# Patient Record
Sex: FEMALE | Race: WHITE | NOT HISPANIC OR LATINO | Employment: OTHER | URBAN - METROPOLITAN AREA
[De-identification: names, ages, dates, MRNs, and addresses within clinical notes are randomized per-mention and may not be internally consistent; named-entity substitution may affect disease eponyms.]

---

## 2017-01-06 ENCOUNTER — TELEPHONE (OUTPATIENT)
Dept: CARDIOLOGY | Facility: CLINIC | Age: 82
End: 2017-01-06

## 2017-01-06 NOTE — TELEPHONE ENCOUNTER
----- Message from KRAMA Edge sent at 12/19/2016  2:59 PM EST -----    Also, reassess BP on increased dose of BB    ----- Message -----     From: KARMA Edge     Sent: 12/17/2016  10:45 AM       To: KARMA Edge      Follow up on echo 12/30  Look at my note and talk with patient about atarax and thyroid contributing to jittery feeling

## 2017-01-06 NOTE — TELEPHONE ENCOUNTER
I reviewed echocardiogram results:     · Left ventricular function is normal.  · Left ventricular function is normal. Calculated EF = 65.2%.  · There are myxomatous changes of the mitral valve apparatus present.  · There is mild prolapse of both mitral valve leaflets.  · Trace mitral valve regurgitation is present.  · Mild to moderate tricuspid valve regurgitation is present.  · The calculated right ventricular systolic pressure is 30 mmHg (normal  · There is no evidence of pericardial effusion.    I just spoke to patient about echocardiogram results. We also discussed the metoprolol tartrate.  He said that she increased the medication to 25 mg twice a day and did not see much of a difference.  She is currently taking metoprolol tartrate 25 mg half tablet twice daily.    The patient did wear a Zio patch monitor and turned it back in.  The results are pending.  I will plan to follow up with the patient with results of her testing.  I've also recommended that she follow-up with her primary care physician about this jittery feeling as it may be secondary to her Atarax or possibly her thyroid.

## 2017-01-12 ENCOUNTER — TELEPHONE (OUTPATIENT)
Dept: CARDIOLOGY | Facility: CLINIC | Age: 82
End: 2017-01-12

## 2017-01-12 NOTE — TELEPHONE ENCOUNTER
The patient wore a Zio patch monitor her for 2 days and 23 hours for palpitations.  This showed normal sinus rhythm with average heart rate of 68 beats per minutes, range  bpm.  Rare premature atrial contractions and PVCs.  She was noted to have periods of ventricular bigeminy.  No sustained bradycardia or tachyarrhythmias.  Overall impression by Dr. Davon Segura was relatively benign monitor study.    I spoke with patient about results. She is still taking the metoprolol tartrate 25 mg twice daily.     She still feels jittery. I have recommended follow up that she follow up her PCP about the Atarax and thyroid possibly contributing to this jittery and anxious feeling.

## 2017-01-12 NOTE — TELEPHONE ENCOUNTER
----- Message from KARMA Edge sent at 1/11/2017  2:40 PM EST -----    Results pending    ----- Message -----     From: KARMA Edge     Sent: 1/10/2017  11:54 AM       To: KARMA Edge      Results pending    ----- Message -----     From: KARMA Edge     Sent: 1/6/2017   8:40 AM       To: KARMA Edge      Follow up on Zio patch monitor results

## 2017-05-17 DIAGNOSIS — R45.0 JITTERY FEELING: ICD-10-CM

## 2017-05-17 DIAGNOSIS — I48.0 PAROXYSMAL ATRIAL FIBRILLATION (HCC): ICD-10-CM

## 2017-12-18 ENCOUNTER — OFFICE VISIT (OUTPATIENT)
Dept: CARDIOLOGY | Facility: CLINIC | Age: 82
End: 2017-12-18

## 2017-12-18 VITALS
BODY MASS INDEX: 19.09 KG/M2 | HEART RATE: 68 BPM | HEIGHT: 65 IN | SYSTOLIC BLOOD PRESSURE: 158 MMHG | WEIGHT: 114.6 LBS | DIASTOLIC BLOOD PRESSURE: 80 MMHG

## 2017-12-18 DIAGNOSIS — R00.2 PALPITATIONS: Primary | ICD-10-CM

## 2017-12-18 PROCEDURE — 99213 OFFICE O/P EST LOW 20 MIN: CPT | Performed by: INTERNAL MEDICINE

## 2017-12-18 PROCEDURE — 93000 ELECTROCARDIOGRAM COMPLETE: CPT | Performed by: INTERNAL MEDICINE

## 2017-12-18 RX ORDER — ASPIRIN 81 MG/1
81 TABLET ORAL DAILY
COMMUNITY

## 2017-12-18 NOTE — PROGRESS NOTES
Date of Office Visit: 2017  Encounter Provider: Anthony Segura MD  Place of Service: [facility] CARDIOLOGY  Patient Name: Kiya Cramer  :1935  [id number]    Chief Complaint   Patient presents with   • Cardiac Valve Problem   • Atrial Fibrillation   :     HPI: Kiya Cramer is a 82 y.o. female  in the past she had 35 seconds of A. fib seen on the Holter at that time that aren't psych medicines were all out of whack and she was not in a good place.  We elected to manage her medically and conservatively with just a beta blocker and guess what she has done great.  No PND orthopnea and edema syncope palpitations bleeding difficulty she's exercising regularly    Past Medical History:   Diagnosis Date   • Acquired hypothyroidism 2015   • Health care maintenance    • Heart murmur    • Hypertension    • Mitral regurgitation     Trace to mild per 2-D echocardiogram 3/25/2013   • Mitral valve prolapse     Mild per 2-D echocardiogram 2016-trace mitral valve regurgitation   • Palpitations 2016    Description: had a prior holter with 35 sec of AF; stopped dilt and pradaxa in 3/13; added metoprolol; more palp and will get event recorder; event recorder was normal   • Paroxysmal atrial fibrillation     History of 35 second run of paroxysmal atrial fibrillation per Holter; The patient previously was on diltiazem and this was discontinued due to adverse effects; she was on Pradaxa but has an unsteady gait and this was discontinued; she has remained on metoprolol tartrate   • Right bundle branch block     Chronic   • Syndrome of inappropriate secretion of antidiuretic hormone 2016    Description: NA was 130 in 3/13   • Tricuspid regurgitation     Mild to moderate per 2-D echocardiogram 2016   • Unsteady gait    • Vitamin D deficiency        Past Surgical History:   Procedure Laterality Date   • HYSTERECTOMY         Social History     Social History   •  Marital status:      Spouse name: N/A   • Number of children: N/A   • Years of education: N/A     Occupational History   • Not on file.     Social History Main Topics   • Smoking status: Never Smoker   • Smokeless tobacco: Not on file      Comment: caffeine use    • Alcohol use No   • Drug use: Not on file   • Sexual activity: Defer     Other Topics Concern   • Not on file     Social History Narrative       Family History   Problem Relation Age of Onset   • Heart disease Mother    • Heart disease Father    • Heart failure Father        Review of Systems   Constitution: Negative for decreased appetite, fever, malaise/fatigue and weight loss.   HENT: Negative for nosebleeds.    Eyes: Negative for double vision.   Cardiovascular: Negative for chest pain, claudication, cyanosis, dyspnea on exertion, irregular heartbeat, leg swelling, near-syncope, orthopnea, palpitations, paroxysmal nocturnal dyspnea and syncope.   Respiratory: Negative for cough, hemoptysis and shortness of breath.    Hematologic/Lymphatic: Negative for bleeding problem.   Skin: Negative for rash.   Musculoskeletal: Negative for falls and myalgias.   Gastrointestinal: Negative for hematochezia, jaundice, melena, nausea and vomiting.   Genitourinary: Negative for hematuria.   Neurological: Negative for dizziness and seizures.   Psychiatric/Behavioral: Negative for altered mental status and memory loss.       Allergies   Allergen Reactions   • Cardizem [Diltiazem Hcl]          Current Outpatient Prescriptions:   •  alendronate (FOSAMAX) 70 MG tablet, Take  by mouth., Disp: , Rfl:   •  aspirin 81 MG EC tablet, Take 81 mg by mouth Daily., Disp: , Rfl:   •  clonazePAM (KlonoPIN) 0.5 MG tablet, Take  by mouth., Disp: , Rfl:   •  doxepin (SINEquan) 10 MG capsule, Take  by mouth., Disp: , Rfl:   •  levothyroxine (SYNTHROID, LEVOTHROID) 25 MCG tablet, Take 50 mcg by mouth., Disp: , Rfl:   •  metoprolol tartrate (LOPRESSOR) 25 MG tablet, TAKE ONE TABLET  "BY MOUTH TWICE A DAY, Disp: 60 tablet, Rfl: 5  •  mirtazapine (REMERON) 30 MG tablet, Take  by mouth., Disp: , Rfl:      Objective:     Vitals:    12/18/17 1302   BP: 158/80   Pulse: 68   Weight: 52 kg (114 lb 9.6 oz)   Height: 165.1 cm (65\")     Body mass index is 19.07 kg/(m^2).    Physical Exam   Constitutional: She is oriented to person, place, and time. She appears well-developed and well-nourished.   HENT:   Head: Normocephalic.   Eyes: No scleral icterus.   Neck: No JVD present. No thyromegaly present.   Cardiovascular: Normal rate, regular rhythm and normal heart sounds.  Exam reveals no gallop and no friction rub.    No murmur heard.  Pulmonary/Chest: Effort normal and breath sounds normal. She has no wheezes. She has no rales.   Abdominal: Soft. There is no hepatosplenomegaly. There is no tenderness.   Musculoskeletal: Normal range of motion. She exhibits no edema.   Lymphadenopathy:     She has no cervical adenopathy.   Neurological: She is alert and oriented to person, place, and time.   Skin: Skin is warm and dry. No rash noted.   Psychiatric: She has a normal mood and affect.         ECG 12 Lead  Date/Time: 12/18/2017 1:36 PM  Performed by: KENA STOUT  Authorized by: KENA STOUT   Comparison: compared with previous ECG   Similar to previous ECG  Rhythm: sinus rhythm  Conduction: complete RBBB  Clinical impression: abnormal ECG             Assessment:       Diagnosis Plan   1. Palpitations            Plan:       She's doing great probably because I don't think she has a whole lot wrong with her the whole issues can be hopefully she never recurs with A. fib because were not anticoagulating her but I can't really see doing effort is 35 seconds, and she's had no symptoms of recurrence we will continue her on her metoprolol have her come back and see Lucille in a year and see me in 2 years    As always, it has been a pleasure to participate in your patient's care.      Sincerely,       Kena " MD [place]

## 2018-05-16 DIAGNOSIS — I48.0 PAROXYSMAL ATRIAL FIBRILLATION (HCC): ICD-10-CM

## 2018-05-16 DIAGNOSIS — R45.0 JITTERY FEELING: ICD-10-CM

## 2018-12-19 ENCOUNTER — OFFICE VISIT (OUTPATIENT)
Dept: CARDIOLOGY | Facility: CLINIC | Age: 83
End: 2018-12-19

## 2018-12-19 VITALS
SYSTOLIC BLOOD PRESSURE: 130 MMHG | HEIGHT: 68 IN | DIASTOLIC BLOOD PRESSURE: 82 MMHG | HEART RATE: 75 BPM | OXYGEN SATURATION: 98 % | WEIGHT: 125 LBS | BODY MASS INDEX: 18.94 KG/M2

## 2018-12-19 DIAGNOSIS — I48.0 PAROXYSMAL ATRIAL FIBRILLATION (HCC): Primary | ICD-10-CM

## 2018-12-19 PROCEDURE — 93000 ELECTROCARDIOGRAM COMPLETE: CPT | Performed by: PHYSICIAN ASSISTANT

## 2018-12-19 PROCEDURE — 99213 OFFICE O/P EST LOW 20 MIN: CPT | Performed by: PHYSICIAN ASSISTANT

## 2018-12-19 RX ORDER — SUVOREXANT 10 MG/1
10 TABLET, FILM COATED ORAL DAILY
COMMUNITY
Start: 2018-11-23

## 2018-12-19 NOTE — PROGRESS NOTES
Date of Office Visit: 2018  Encounter Provider: HENRIETTA Calderón  Place of Service: [facility] CARDIOLOGY  Patient Name: Kiya Cramer  :1935    Chief Complaint   Patient presents with   • Atrial Fibrillation     1 year follow up   :     HPI: Kiya Cramer is a 83 y.o. female who presents today for follow-up.  Old records have been obtained and reviewed by me.  She is a patient of Dr. Segura's with a past cardiac history significant for atrial fibrillation.  She had 35 seconds of atrial fibrillation that was found on a Holter monitor.  At the time that she was wearing the Holter monitor, her psychiatric meds were to being adjusted and she was not any good place according to Dr. Segura.  She has been managed medically and conservatively with beta-blockade and done very well.  She was last in our office to see Dr. Segura on 2017.  At that visit she was in sinus rhythm and was doing well without complaints of angina or heart failure.  No changes were made to her medical regimen, and she's here today for yearly visit.   Over the past year she's been doing well.  She denies any chest pain, shortness of breath, palpitations, edema, dizziness, or syncope.  She exercises at the gym 3 days a week doing [employer] classes.  She can do this without difficulty.  She does not think she has had any episodes of palpitations, high heart rates, or atrial fibrillation.    Past Medical History:   Diagnosis Date   • Acquired hypothyroidism 2015   • Health care maintenance    • Heart murmur    • Hypertension    • Mitral regurgitation     Trace to mild per 2-D echocardiogram 3/25/2013   • Mitral valve prolapse     Mild per 2-D echocardiogram 2016-trace mitral valve regurgitation   • Palpitations 2016    Description: had a prior holter with 35 sec of AF; stopped dilt and pradaxa in 3/13; added metoprolol; more palp and will get event recorder; event recorder  was normal   • Paroxysmal atrial fibrillation (CMS/HCC)     History of 35 second run of paroxysmal atrial fibrillation per Holter; The patient previously was on diltiazem and this was discontinued due to adverse effects; she was on Pradaxa but has an unsteady gait and this was discontinued; she has remained on metoprolol tartrate   • Right bundle branch block     Chronic   • Syndrome of inappropriate secretion of antidiuretic hormone (CMS/HCC) 12/14/2016    Description: NA was 130 in 3/13   • Tricuspid regurgitation     Mild to moderate per 2-D echocardiogram 12/30/2016   • Unsteady gait    • Vitamin D deficiency        Past Surgical History:   Procedure Laterality Date   • HYSTERECTOMY         Social History     Socioeconomic History   • Marital status:      Spouse name: Not on file   • Number of children: Not on file   • Years of education: Not on file   • Highest education level: Not on file   Social Needs   • Financial resource strain: Not on file   • Food insecurity - worry: Not on file   • Food insecurity - inability: Not on file   • Transportation needs - medical: Not on file   • Transportation needs - non-medical: Not on file   Occupational History   • Not on file   Tobacco Use   • Smoking status: Never Smoker   • Smokeless tobacco: Never Used   • Tobacco comment: caffeine use    Substance and Sexual Activity   • Alcohol use: No   • Drug use: No   • Sexual activity: Defer   Other Topics Concern   • Not on file   Social History Narrative   • Not on file       Family History   Problem Relation Age of Onset   • Heart disease Mother    • Heart disease Father    • Heart failure Father    • No Known Problems Maternal Grandmother    • No Known Problems Maternal Grandfather    • No Known Problems Paternal Grandmother    • No Known Problems Paternal Grandfather        Review of Systems   Constitution: Negative for chills, fever and malaise/fatigue.   Cardiovascular: Negative for chest pain, dyspnea on  "exertion, leg swelling, near-syncope, orthopnea, palpitations, paroxysmal nocturnal dyspnea and syncope.   Respiratory: Negative for cough and shortness of breath.    Musculoskeletal: Negative for joint pain, joint swelling and myalgias.   Gastrointestinal: Negative for abdominal pain, diarrhea, melena, nausea and vomiting.   Genitourinary: Negative for frequency and hematuria.   Neurological: Negative for light-headedness, numbness, paresthesias and seizures.   Allergic/Immunologic: Negative.    All other systems reviewed and are negative.      Allergies   Allergen Reactions   • Cardizem [Diltiazem Hcl]          Current Outpatient Medications:   •  alendronate (FOSAMAX) 70 MG tablet, Take 70 mg by mouth Every 7 (Seven) Days., Disp: , Rfl:   •  aspirin 81 MG EC tablet, Take 81 mg by mouth Daily., Disp: , Rfl:   •  BELSOMRA 10 MG tablet, Take 10 mg by mouth Daily., Disp: , Rfl:   •  clonazePAM (KlonoPIN) 0.5 MG tablet, Take 0.5 mg by mouth Daily., Disp: , Rfl:   •  doxepin (SINEquan) 10 MG capsule, Take 20 mg by mouth Every Night., Disp: , Rfl:   •  levothyroxine (SYNTHROID, LEVOTHROID) 25 MCG tablet, Take 50 mcg by mouth Daily., Disp: , Rfl:   •  metoprolol tartrate (LOPRESSOR) 25 MG tablet, TAKE ONE TABLET BY MOUTH TWICE A DAY (Patient taking differently: TAKE 0.5 TABLET BY MOUTH TWICE A DAY), Disp: 180 tablet, Rfl: 3  •  mirtazapine (REMERON) 30 MG tablet, Take 15 mg by mouth Every Night., Disp: , Rfl:       Objective:     Vitals:    12/19/18 1426 12/19/18 1434   BP: 126/78 130/82   BP Location: Right arm Left arm   Pulse: 75    SpO2: 98%    Weight: 56.7 kg (125 lb)    Height: 172.7 cm (68\")      Body mass index is 19.01 kg/m².    PHYSICAL EXAM:    Physical Exam   Constitutional: She is oriented to person, place, and time. She appears well-developed and well-nourished. No distress.   HENT:   Head: Normocephalic and atraumatic.   Eyes: Pupils are equal, round, and reactive to light.   Neck: No JVD present. No " thyromegaly present.   Cardiovascular: Normal rate, regular rhythm, normal heart sounds and intact distal pulses.   No murmur heard.  Pulmonary/Chest: Effort normal and breath sounds normal. No respiratory distress.   Abdominal: Soft. Bowel sounds are normal. She exhibits no distension. There is no splenomegaly or hepatomegaly. There is no tenderness.   Musculoskeletal: Normal range of motion. She exhibits no edema.   Neurological: She is alert and oriented to person, place, and time.   Skin: Skin is warm and dry. She is not diaphoretic. No erythema.   Psychiatric: She has a normal mood and affect. Her behavior is normal. Judgment normal.         ECG 12 Lead  Date/Time: 12/19/2018 2:54 PM  Performed by: Lucille Hemphill PA  Authorized by: Lucille Hemphill PA   Comparison: compared with previous ECG from 12/18/2017  Similar to previous ECG  Rhythm: sinus rhythm  BPM: 67  Conduction: right bundle branch block  Clinical impression: abnormal ECG  Comments: Indication: Paroxysmal atrial fibrillation.              Assessment:       Diagnosis Plan   1. Paroxysmal atrial fibrillation (CMS/HCC)  ECG 12 Lead     Orders Placed This Encounter   Procedures   • ECG 12 Lead     This order was created via procedure documentation          Plan:       Atrial Fibrillation and Atrial Flutter  Assessment  • The patient has paroxysmal atrial fibrillation  • This is non-valvular in etiology  • The patient's CHADS2-VASc score is 3  • A [id number] score of 2 or more is considered a high risk for a thromboembolic event  • Aspirin prescribed    Plan  • Attempt to maintain sinus rhythm  • Add aspirin for antithrombotic therapy  • Continue beta blocker for rhythm control  • Continue beta blocker for rate control    Subjective - Objective  • The average duration of atrial fibrillation episodes is <48 hours  • She had a 35 seconds of atrial fibrillation when her psychiatric medications were being adjusted.  She has not had any since.  For  this reason, she has not been anticoagulated.  Overall she is doing well without complaints of angina or heart failure.  I'm not going to make any changes to her medical regimen, and she will follow-up with Dr. Segura in 1 year or sooner if needed.        As always, it has been a pleasure to participate in your patient's care.      Sincerely,         Lucille Hemphill PA-C

## 2019-07-10 DIAGNOSIS — R45.0 JITTERY FEELING: ICD-10-CM

## 2019-07-10 DIAGNOSIS — I48.0 PAROXYSMAL ATRIAL FIBRILLATION (HCC): ICD-10-CM

## 2019-07-10 NOTE — TELEPHONE ENCOUNTER
Leno patient needs a refill on her metoprolol  Please correct med. List and send in  Thanks, Maribell

## 2019-09-06 ENCOUNTER — LAB REQUISITION (OUTPATIENT)
Dept: LAB | Facility: HOSPITAL | Age: 84
End: 2019-09-06

## 2019-09-06 DIAGNOSIS — Z00.00 ROUTINE GENERAL MEDICAL EXAMINATION AT A HEALTH CARE FACILITY: ICD-10-CM

## 2019-09-06 LAB
ALBUMIN SERPL-MCNC: 4.1 G/DL (ref 3.5–5.2)
ALBUMIN/GLOB SERPL: 1.7 G/DL
ALP SERPL-CCNC: 234 U/L (ref 39–117)
ALT SERPL W P-5'-P-CCNC: 11 U/L (ref 1–33)
ANION GAP SERPL CALCULATED.3IONS-SCNC: 13.8 MMOL/L (ref 5–15)
AST SERPL-CCNC: 18 U/L (ref 1–32)
BILIRUB SERPL-MCNC: 0.3 MG/DL (ref 0.2–1.2)
BUN BLD-MCNC: 20 MG/DL (ref 8–23)
BUN/CREAT SERPL: 31.7 (ref 7–25)
CALCIUM SPEC-SCNC: 9.2 MG/DL (ref 8.6–10.5)
CHLORIDE SERPL-SCNC: 100 MMOL/L (ref 98–107)
CO2 SERPL-SCNC: 26.2 MMOL/L (ref 22–29)
CREAT BLD-MCNC: 0.63 MG/DL (ref 0.57–1)
GFR SERPL CREATININE-BSD FRML MDRD: 90 ML/MIN/1.73
GLOBULIN UR ELPH-MCNC: 2.4 GM/DL
GLUCOSE BLD-MCNC: 93 MG/DL (ref 65–99)
POTASSIUM BLD-SCNC: 4.3 MMOL/L (ref 3.5–5.2)
PROT SERPL-MCNC: 6.5 G/DL (ref 6–8.5)
SODIUM BLD-SCNC: 140 MMOL/L (ref 136–145)

## 2019-09-06 PROCEDURE — 80053 COMPREHEN METABOLIC PANEL: CPT

## 2019-10-15 ENCOUNTER — TELEPHONE (OUTPATIENT)
Dept: CARDIOLOGY | Facility: CLINIC | Age: 84
End: 2019-10-15

## 2019-10-15 RX ORDER — METOPROLOL TARTRATE 50 MG/1
50 TABLET, FILM COATED ORAL 2 TIMES DAILY
Qty: 180 TABLET | Refills: 2 | Status: SHIPPED | OUTPATIENT
Start: 2019-10-15 | End: 2019-11-01 | Stop reason: SDUPTHER

## 2019-10-15 NOTE — TELEPHONE ENCOUNTER
Daughter called stated needed to get a refill on metoprolol 50mg 1 po bid  States this was increased at [facility] cardiologist she had fallen and they increased this  Is this ok to refill?

## 2019-11-01 RX ORDER — METOPROLOL TARTRATE 50 MG/1
50 TABLET, FILM COATED ORAL 2 TIMES DAILY
Qty: 180 TABLET | Refills: 2 | Status: SHIPPED | OUTPATIENT
Start: 2019-11-01

## 2023-03-06 ENCOUNTER — APPOINTMENT (OUTPATIENT)
Dept: GENERAL RADIOLOGY | Facility: HOSPITAL | Age: 88
DRG: 177 | End: 2023-03-06
Payer: MEDICARE

## 2023-03-06 ENCOUNTER — APPOINTMENT (OUTPATIENT)
Dept: CT IMAGING | Facility: HOSPITAL | Age: 88
DRG: 177 | End: 2023-03-06
Payer: MEDICARE

## 2023-03-06 ENCOUNTER — HOSPITAL ENCOUNTER (INPATIENT)
Facility: HOSPITAL | Age: 88
LOS: 2 days | Discharge: NURSING, REHAB OR LONG-TERM CARE | DRG: 177 | End: 2023-03-10
Attending: EMERGENCY MEDICINE | Admitting: INTERNAL MEDICINE
Payer: MEDICARE

## 2023-03-06 DIAGNOSIS — R47.81 SLURRED SPEECH: ICD-10-CM

## 2023-03-06 DIAGNOSIS — R41.82 ALTERED MENTAL STATUS, UNSPECIFIED ALTERED MENTAL STATUS TYPE: Primary | ICD-10-CM

## 2023-03-06 DIAGNOSIS — N39.0 URINARY TRACT INFECTION WITHOUT HEMATURIA, SITE UNSPECIFIED: ICD-10-CM

## 2023-03-06 LAB — GLUCOSE BLDC GLUCOMTR-MCNC: 110 MG/DL (ref 70–130)

## 2023-03-06 PROCEDURE — 81001 URINALYSIS AUTO W/SCOPE: CPT | Performed by: EMERGENCY MEDICINE

## 2023-03-06 PROCEDURE — 80307 DRUG TEST PRSMV CHEM ANLYZR: CPT | Performed by: EMERGENCY MEDICINE

## 2023-03-06 PROCEDURE — 93005 ELECTROCARDIOGRAM TRACING: CPT | Performed by: EMERGENCY MEDICINE

## 2023-03-06 PROCEDURE — 84481 FREE ASSAY (FT-3): CPT | Performed by: EMERGENCY MEDICINE

## 2023-03-06 PROCEDURE — 85025 COMPLETE CBC W/AUTO DIFF WBC: CPT | Performed by: EMERGENCY MEDICINE

## 2023-03-06 PROCEDURE — 85730 THROMBOPLASTIN TIME PARTIAL: CPT | Performed by: EMERGENCY MEDICINE

## 2023-03-06 PROCEDURE — 87086 URINE CULTURE/COLONY COUNT: CPT | Performed by: EMERGENCY MEDICINE

## 2023-03-06 PROCEDURE — 84443 ASSAY THYROID STIM HORMONE: CPT | Performed by: EMERGENCY MEDICINE

## 2023-03-06 PROCEDURE — P9612 CATHETERIZE FOR URINE SPEC: HCPCS

## 2023-03-06 PROCEDURE — 82077 ASSAY SPEC XCP UR&BREATH IA: CPT | Performed by: EMERGENCY MEDICINE

## 2023-03-06 PROCEDURE — 99285 EMERGENCY DEPT VISIT HI MDM: CPT

## 2023-03-06 PROCEDURE — 70450 CT HEAD/BRAIN W/O DYE: CPT

## 2023-03-06 PROCEDURE — 83735 ASSAY OF MAGNESIUM: CPT | Performed by: EMERGENCY MEDICINE

## 2023-03-06 PROCEDURE — 84145 PROCALCITONIN (PCT): CPT | Performed by: EMERGENCY MEDICINE

## 2023-03-06 PROCEDURE — 84484 ASSAY OF TROPONIN QUANT: CPT | Performed by: EMERGENCY MEDICINE

## 2023-03-06 PROCEDURE — 83880 ASSAY OF NATRIURETIC PEPTIDE: CPT | Performed by: EMERGENCY MEDICINE

## 2023-03-06 PROCEDURE — 85610 PROTHROMBIN TIME: CPT | Performed by: EMERGENCY MEDICINE

## 2023-03-06 PROCEDURE — 83605 ASSAY OF LACTIC ACID: CPT | Performed by: EMERGENCY MEDICINE

## 2023-03-06 PROCEDURE — 82962 GLUCOSE BLOOD TEST: CPT

## 2023-03-06 PROCEDURE — 80053 COMPREHEN METABOLIC PANEL: CPT | Performed by: EMERGENCY MEDICINE

## 2023-03-06 PROCEDURE — 71045 X-RAY EXAM CHEST 1 VIEW: CPT

## 2023-03-06 PROCEDURE — 84439 ASSAY OF FREE THYROXINE: CPT | Performed by: EMERGENCY MEDICINE

## 2023-03-06 RX ORDER — SODIUM CHLORIDE 0.9 % (FLUSH) 0.9 %
10 SYRINGE (ML) INJECTION AS NEEDED
Status: DISCONTINUED | OUTPATIENT
Start: 2023-03-06 | End: 2023-03-10 | Stop reason: HOSPADM

## 2023-03-07 ENCOUNTER — APPOINTMENT (OUTPATIENT)
Dept: MRI IMAGING | Facility: HOSPITAL | Age: 88
DRG: 177 | End: 2023-03-07
Payer: MEDICARE

## 2023-03-07 PROBLEM — T83.511A UTI (URINARY TRACT INFECTION) DUE TO URINARY INDWELLING CATHETER (HCC): Status: ACTIVE | Noted: 2023-03-07

## 2023-03-07 PROBLEM — D50.9 IRON DEFICIENCY ANEMIA: Status: ACTIVE | Noted: 2023-03-07

## 2023-03-07 PROBLEM — R13.12 OROPHARYNGEAL DYSPHAGIA: Status: ACTIVE | Noted: 2023-03-07

## 2023-03-07 PROBLEM — N39.0 UTI (URINARY TRACT INFECTION) DUE TO URINARY INDWELLING CATHETER (HCC): Status: ACTIVE | Noted: 2023-03-07

## 2023-03-07 PROBLEM — R41.82 ALTERED MENTAL STATUS, UNSPECIFIED ALTERED MENTAL STATUS TYPE: Status: ACTIVE | Noted: 2023-03-07

## 2023-03-07 PROBLEM — J18.9 CAP (COMMUNITY ACQUIRED PNEUMONIA): Status: ACTIVE | Noted: 2023-03-07

## 2023-03-07 LAB
ALBUMIN SERPL-MCNC: 3.1 G/DL (ref 3.5–5.2)
ALBUMIN SERPL-MCNC: 3.6 G/DL (ref 3.5–5.2)
ALBUMIN/GLOB SERPL: 0.9 G/DL
ALBUMIN/GLOB SERPL: 1 G/DL
ALP SERPL-CCNC: 132 U/L (ref 39–117)
ALP SERPL-CCNC: 149 U/L (ref 39–117)
ALT SERPL W P-5'-P-CCNC: 14 U/L (ref 1–33)
ALT SERPL W P-5'-P-CCNC: 8 U/L (ref 1–33)
AMPHET+METHAMPHET UR QL: NEGATIVE
ANION GAP SERPL CALCULATED.3IONS-SCNC: 11 MMOL/L (ref 5–15)
ANION GAP SERPL CALCULATED.3IONS-SCNC: 12 MMOL/L (ref 5–15)
APTT PPP: 33.2 SECONDS (ref 22.7–35.4)
AST SERPL-CCNC: 14 U/L (ref 1–32)
AST SERPL-CCNC: 15 U/L (ref 1–32)
B PARAPERT DNA SPEC QL NAA+PROBE: NOT DETECTED
B PERT DNA SPEC QL NAA+PROBE: NOT DETECTED
BACTERIA UR QL AUTO: ABNORMAL /HPF
BARBITURATES UR QL SCN: NEGATIVE
BASOPHILS # BLD AUTO: 0.02 10*3/MM3 (ref 0–0.2)
BASOPHILS NFR BLD AUTO: 0.2 % (ref 0–1.5)
BENZODIAZ UR QL SCN: NEGATIVE
BILIRUB SERPL-MCNC: 0.2 MG/DL (ref 0–1.2)
BILIRUB SERPL-MCNC: 0.3 MG/DL (ref 0–1.2)
BILIRUB UR QL STRIP: NEGATIVE
BUN SERPL-MCNC: 20 MG/DL (ref 8–23)
BUN SERPL-MCNC: 26 MG/DL (ref 8–23)
BUN/CREAT SERPL: 40.6 (ref 7–25)
BUN/CREAT SERPL: 50 (ref 7–25)
C PNEUM DNA NPH QL NAA+NON-PROBE: NOT DETECTED
CALCIUM SPEC-SCNC: 9 MG/DL (ref 8.6–10.5)
CALCIUM SPEC-SCNC: 9.9 MG/DL (ref 8.6–10.5)
CANNABINOIDS SERPL QL: NEGATIVE
CHLORIDE SERPL-SCNC: 89 MMOL/L (ref 98–107)
CHLORIDE SERPL-SCNC: 91 MMOL/L (ref 98–107)
CHOLEST SERPL-MCNC: 160 MG/DL (ref 0–200)
CLARITY UR: CLEAR
CO2 SERPL-SCNC: 29 MMOL/L (ref 22–29)
CO2 SERPL-SCNC: 30 MMOL/L (ref 22–29)
COCAINE UR QL: NEGATIVE
COLOR UR: ABNORMAL
CREAT SERPL-MCNC: 0.4 MG/DL (ref 0.57–1)
CREAT SERPL-MCNC: 0.64 MG/DL (ref 0.57–1)
D-LACTATE SERPL-SCNC: 1.3 MMOL/L (ref 0.5–2)
DEPRECATED RDW RBC AUTO: 44.3 FL (ref 37–54)
DEPRECATED RDW RBC AUTO: 46.5 FL (ref 37–54)
EGFRCR SERPLBLD CKD-EPI 2021: 86.7 ML/MIN/1.73
EGFRCR SERPLBLD CKD-EPI 2021: 97.1 ML/MIN/1.73
EOSINOPHIL # BLD AUTO: 0.05 10*3/MM3 (ref 0–0.4)
EOSINOPHIL NFR BLD AUTO: 0.5 % (ref 0.3–6.2)
ERYTHROCYTE [DISTWIDTH] IN BLOOD BY AUTOMATED COUNT: 13.2 % (ref 12.3–15.4)
ERYTHROCYTE [DISTWIDTH] IN BLOOD BY AUTOMATED COUNT: 13.5 % (ref 12.3–15.4)
ETHANOL BLD-MCNC: <10 MG/DL (ref 0–10)
ETHANOL UR QL: <0.01 %
FLUAV SUBTYP SPEC NAA+PROBE: NOT DETECTED
FLUBV RNA ISLT QL NAA+PROBE: NOT DETECTED
GEN 5 2HR TROPONIN T REFLEX: 18 NG/L
GLOBULIN UR ELPH-MCNC: 3.4 GM/DL
GLOBULIN UR ELPH-MCNC: 3.6 GM/DL
GLUCOSE BLDC GLUCOMTR-MCNC: 70 MG/DL (ref 70–130)
GLUCOSE BLDC GLUCOMTR-MCNC: 73 MG/DL (ref 70–130)
GLUCOSE SERPL-MCNC: 100 MG/DL (ref 65–99)
GLUCOSE SERPL-MCNC: 90 MG/DL (ref 65–99)
GLUCOSE UR STRIP-MCNC: NEGATIVE MG/DL
HADV DNA SPEC NAA+PROBE: NOT DETECTED
HBA1C MFR BLD: 5 % (ref 4.8–5.6)
HCOV 229E RNA SPEC QL NAA+PROBE: NOT DETECTED
HCOV HKU1 RNA SPEC QL NAA+PROBE: NOT DETECTED
HCOV NL63 RNA SPEC QL NAA+PROBE: NOT DETECTED
HCOV OC43 RNA SPEC QL NAA+PROBE: NOT DETECTED
HCT VFR BLD AUTO: 35.3 % (ref 34–46.6)
HCT VFR BLD AUTO: 36.3 % (ref 34–46.6)
HDLC SERPL-MCNC: 68 MG/DL (ref 40–60)
HGB BLD-MCNC: 11.6 G/DL (ref 12–15.9)
HGB BLD-MCNC: 12.5 G/DL (ref 12–15.9)
HGB UR QL STRIP.AUTO: NEGATIVE
HMPV RNA NPH QL NAA+NON-PROBE: NOT DETECTED
HPIV1 RNA ISLT QL NAA+PROBE: NOT DETECTED
HPIV2 RNA SPEC QL NAA+PROBE: NOT DETECTED
HPIV3 RNA NPH QL NAA+PROBE: NOT DETECTED
HPIV4 P GENE NPH QL NAA+PROBE: NOT DETECTED
HYALINE CASTS UR QL AUTO: ABNORMAL /LPF
IMM GRANULOCYTES # BLD AUTO: 0.42 10*3/MM3 (ref 0–0.05)
IMM GRANULOCYTES NFR BLD AUTO: 4.2 % (ref 0–0.5)
INR PPP: 0.98 (ref 0.9–1.1)
KETONES UR QL STRIP: ABNORMAL
LDLC SERPL CALC-MCNC: 79 MG/DL (ref 0–100)
LDLC/HDLC SERPL: 1.15 {RATIO}
LEUKOCYTE ESTERASE UR QL STRIP.AUTO: ABNORMAL
LYMPHOCYTES # BLD AUTO: 1.06 10*3/MM3 (ref 0.7–3.1)
LYMPHOCYTES NFR BLD AUTO: 10.6 % (ref 19.6–45.3)
M PNEUMO IGG SER IA-ACNC: NOT DETECTED
MAGNESIUM SERPL-MCNC: 2.1 MG/DL (ref 1.6–2.4)
MCH RBC QN AUTO: 30.8 PG (ref 26.6–33)
MCH RBC QN AUTO: 31.5 PG (ref 26.6–33)
MCHC RBC AUTO-ENTMCNC: 32.9 G/DL (ref 31.5–35.7)
MCHC RBC AUTO-ENTMCNC: 34.4 G/DL (ref 31.5–35.7)
MCV RBC AUTO: 91.4 FL (ref 79–97)
MCV RBC AUTO: 93.6 FL (ref 79–97)
METHADONE UR QL SCN: NEGATIVE
MONOCYTES # BLD AUTO: 0.84 10*3/MM3 (ref 0.1–0.9)
MONOCYTES NFR BLD AUTO: 8.4 % (ref 5–12)
NEUTROPHILS NFR BLD AUTO: 7.59 10*3/MM3 (ref 1.7–7)
NEUTROPHILS NFR BLD AUTO: 76.1 % (ref 42.7–76)
NITRITE UR QL STRIP: NEGATIVE
NRBC BLD AUTO-RTO: 0.3 /100 WBC (ref 0–0.2)
NT-PROBNP SERPL-MCNC: 351 PG/ML (ref 0–1800)
OPIATES UR QL: NEGATIVE
OXYCODONE UR QL SCN: NEGATIVE
PH UR STRIP.AUTO: 6 [PH] (ref 5–8)
PLATELET # BLD AUTO: 230 10*3/MM3 (ref 140–450)
PLATELET # BLD AUTO: 246 10*3/MM3 (ref 140–450)
PMV BLD AUTO: 10.1 FL (ref 6–12)
PMV BLD AUTO: 9.2 FL (ref 6–12)
POTASSIUM SERPL-SCNC: 3.7 MMOL/L (ref 3.5–5.2)
POTASSIUM SERPL-SCNC: 3.8 MMOL/L (ref 3.5–5.2)
PROCALCITONIN SERPL-MCNC: 0.07 NG/ML (ref 0–0.25)
PROT SERPL-MCNC: 6.5 G/DL (ref 6–8.5)
PROT SERPL-MCNC: 7.2 G/DL (ref 6–8.5)
PROT UR QL STRIP: ABNORMAL
PROTHROMBIN TIME: 13.1 SECONDS (ref 11.7–14.2)
QT INTERVAL: 396 MS
RBC # BLD AUTO: 3.77 10*6/MM3 (ref 3.77–5.28)
RBC # BLD AUTO: 3.97 10*6/MM3 (ref 3.77–5.28)
RBC # UR STRIP: ABNORMAL /HPF
REF LAB TEST METHOD: ABNORMAL
RHINOVIRUS RNA SPEC NAA+PROBE: NOT DETECTED
RSV RNA NPH QL NAA+NON-PROBE: NOT DETECTED
SARS-COV-2 RNA NPH QL NAA+NON-PROBE: NOT DETECTED
SODIUM SERPL-SCNC: 129 MMOL/L (ref 136–145)
SODIUM SERPL-SCNC: 133 MMOL/L (ref 136–145)
SP GR UR STRIP: 1.02 (ref 1–1.03)
SQUAMOUS #/AREA URNS HPF: ABNORMAL /HPF
T3FREE SERPL-MCNC: 1.61 PG/ML (ref 2–4.4)
T4 FREE SERPL-MCNC: 2.28 NG/DL (ref 0.93–1.7)
TRIGL SERPL-MCNC: 69 MG/DL (ref 0–150)
TROPONIN T DELTA: -7 NG/L
TROPONIN T SERPL HS-MCNC: 25 NG/L
TSH SERPL DL<=0.05 MIU/L-ACNC: 0.65 UIU/ML (ref 0.27–4.2)
TSH SERPL DL<=0.05 MIU/L-ACNC: 1.7 UIU/ML (ref 0.27–4.2)
UROBILINOGEN UR QL STRIP: ABNORMAL
VLDLC SERPL-MCNC: 13 MG/DL (ref 5–40)
WBC # UR STRIP: ABNORMAL /HPF
WBC NRBC COR # BLD: 7.82 10*3/MM3 (ref 3.4–10.8)
WBC NRBC COR # BLD: 9.98 10*3/MM3 (ref 3.4–10.8)

## 2023-03-07 PROCEDURE — 0202U NFCT DS 22 TRGT SARS-COV-2: CPT | Performed by: STUDENT IN AN ORGANIZED HEALTH CARE EDUCATION/TRAINING PROGRAM

## 2023-03-07 PROCEDURE — G0378 HOSPITAL OBSERVATION PER HR: HCPCS

## 2023-03-07 PROCEDURE — 25010000002 CEFTRIAXONE PER 250 MG: Performed by: STUDENT IN AN ORGANIZED HEALTH CARE EDUCATION/TRAINING PROGRAM

## 2023-03-07 PROCEDURE — 80053 COMPREHEN METABOLIC PANEL: CPT | Performed by: NURSE PRACTITIONER

## 2023-03-07 PROCEDURE — 94640 AIRWAY INHALATION TREATMENT: CPT

## 2023-03-07 PROCEDURE — 84484 ASSAY OF TROPONIN QUANT: CPT | Performed by: EMERGENCY MEDICINE

## 2023-03-07 PROCEDURE — 87040 BLOOD CULTURE FOR BACTERIA: CPT | Performed by: EMERGENCY MEDICINE

## 2023-03-07 PROCEDURE — 83036 HEMOGLOBIN GLYCOSYLATED A1C: CPT | Performed by: NURSE PRACTITIONER

## 2023-03-07 PROCEDURE — 82962 GLUCOSE BLOOD TEST: CPT

## 2023-03-07 PROCEDURE — 99223 1ST HOSP IP/OBS HIGH 75: CPT | Performed by: PSYCHIATRY & NEUROLOGY

## 2023-03-07 PROCEDURE — 92610 EVALUATE SWALLOWING FUNCTION: CPT

## 2023-03-07 PROCEDURE — 94799 UNLISTED PULMONARY SVC/PX: CPT

## 2023-03-07 PROCEDURE — 80061 LIPID PANEL: CPT | Performed by: NURSE PRACTITIONER

## 2023-03-07 PROCEDURE — 85027 COMPLETE CBC AUTOMATED: CPT | Performed by: NURSE PRACTITIONER

## 2023-03-07 PROCEDURE — 70551 MRI BRAIN STEM W/O DYE: CPT

## 2023-03-07 PROCEDURE — 93010 ELECTROCARDIOGRAM REPORT: CPT | Performed by: INTERNAL MEDICINE

## 2023-03-07 PROCEDURE — 84443 ASSAY THYROID STIM HORMONE: CPT | Performed by: NURSE PRACTITIONER

## 2023-03-07 PROCEDURE — 25010000002 CEFTRIAXONE PER 250 MG: Performed by: EMERGENCY MEDICINE

## 2023-03-07 RX ORDER — LEVOTHYROXINE SODIUM 0.05 MG/1
50 TABLET ORAL DAILY
Status: DISCONTINUED | OUTPATIENT
Start: 2023-03-07 | End: 2023-03-07

## 2023-03-07 RX ORDER — ALBUTEROL SULFATE 2.5 MG/3ML
2.5 SOLUTION RESPIRATORY (INHALATION) EVERY 12 HOURS PRN
COMMUNITY

## 2023-03-07 RX ORDER — SODIUM CHLORIDE 9 MG/ML
75 INJECTION, SOLUTION INTRAVENOUS CONTINUOUS
Status: DISCONTINUED | OUTPATIENT
Start: 2023-03-07 | End: 2023-03-09

## 2023-03-07 RX ORDER — GUAIFENESIN 200 MG/10ML
400 LIQUID ORAL EVERY 6 HOURS
Status: DISCONTINUED | OUTPATIENT
Start: 2023-03-07 | End: 2023-03-10 | Stop reason: HOSPADM

## 2023-03-07 RX ORDER — MIRTAZAPINE 15 MG/1
15 TABLET, FILM COATED ORAL NIGHTLY
Status: DISCONTINUED | OUTPATIENT
Start: 2023-03-07 | End: 2023-03-10 | Stop reason: HOSPADM

## 2023-03-07 RX ORDER — FERROUS SULFATE 300 MG/5ML
300 LIQUID (ML) ORAL EVERY EVENING
Status: DISCONTINUED | OUTPATIENT
Start: 2023-03-07 | End: 2023-03-10 | Stop reason: HOSPADM

## 2023-03-07 RX ORDER — ASPIRIN 81 MG/1
81 TABLET ORAL DAILY
Status: DISCONTINUED | OUTPATIENT
Start: 2023-03-08 | End: 2023-03-10 | Stop reason: HOSPADM

## 2023-03-07 RX ORDER — ASPIRIN 325 MG
325 TABLET ORAL DAILY
Status: DISCONTINUED | OUTPATIENT
Start: 2023-03-07 | End: 2023-03-07

## 2023-03-07 RX ORDER — TRAZODONE HYDROCHLORIDE 50 MG/1
50 TABLET ORAL NIGHTLY
COMMUNITY

## 2023-03-07 RX ORDER — BISACODYL 10 MG
10 SUPPOSITORY, RECTAL RECTAL DAILY PRN
Status: DISCONTINUED | OUTPATIENT
Start: 2023-03-07 | End: 2023-03-10 | Stop reason: HOSPADM

## 2023-03-07 RX ORDER — SENNA PLUS 8.6 MG/1
1 TABLET ORAL NIGHTLY
Status: DISCONTINUED | OUTPATIENT
Start: 2023-03-07 | End: 2023-03-10 | Stop reason: HOSPADM

## 2023-03-07 RX ORDER — TRAZODONE HYDROCHLORIDE 50 MG/1
50 TABLET ORAL NIGHTLY
Status: DISCONTINUED | OUTPATIENT
Start: 2023-03-07 | End: 2023-03-10 | Stop reason: HOSPADM

## 2023-03-07 RX ORDER — FERROUS SULFATE 300 MG/5ML
300 LIQUID (ML) ORAL DAILY
Status: DISCONTINUED | OUTPATIENT
Start: 2023-03-07 | End: 2023-03-07

## 2023-03-07 RX ORDER — ONDANSETRON 2 MG/ML
4 INJECTION INTRAMUSCULAR; INTRAVENOUS EVERY 6 HOURS PRN
Status: DISCONTINUED | OUTPATIENT
Start: 2023-03-07 | End: 2023-03-10 | Stop reason: HOSPADM

## 2023-03-07 RX ORDER — ACETAMINOPHEN 650 MG/1
650 SUPPOSITORY RECTAL EVERY 4 HOURS PRN
Status: DISCONTINUED | OUTPATIENT
Start: 2023-03-07 | End: 2023-03-10 | Stop reason: HOSPADM

## 2023-03-07 RX ORDER — LEVOTHYROXINE SODIUM 0.07 MG/1
75 TABLET ORAL DAILY
Status: DISCONTINUED | OUTPATIENT
Start: 2023-03-08 | End: 2023-03-10 | Stop reason: HOSPADM

## 2023-03-07 RX ORDER — ALBUTEROL SULFATE 2.5 MG/3ML
2.5 SOLUTION RESPIRATORY (INHALATION) 2 TIMES DAILY PRN
Status: DISCONTINUED | OUTPATIENT
Start: 2023-03-07 | End: 2023-03-10 | Stop reason: HOSPADM

## 2023-03-07 RX ORDER — BUDESONIDE 0.5 MG/2ML
0.5 INHALANT ORAL 2 TIMES DAILY
COMMUNITY

## 2023-03-07 RX ORDER — SACCHAROMYCES BOULARDII 250 MG
250 CAPSULE ORAL 2 TIMES DAILY
Status: DISCONTINUED | OUTPATIENT
Start: 2023-03-07 | End: 2023-03-10 | Stop reason: HOSPADM

## 2023-03-07 RX ORDER — ACETAMINOPHEN 325 MG/1
650 TABLET ORAL EVERY 4 HOURS PRN
Status: DISCONTINUED | OUTPATIENT
Start: 2023-03-07 | End: 2023-03-10 | Stop reason: HOSPADM

## 2023-03-07 RX ORDER — METOPROLOL TARTRATE 50 MG/1
50 TABLET, FILM COATED ORAL 2 TIMES DAILY
Status: DISCONTINUED | OUTPATIENT
Start: 2023-03-07 | End: 2023-03-10 | Stop reason: HOSPADM

## 2023-03-07 RX ORDER — LATANOPROSTENE BUNOD 0.24 MG/ML
1 SOLUTION/ DROPS OPHTHALMIC
COMMUNITY

## 2023-03-07 RX ORDER — BUDESONIDE 0.5 MG/2ML
0.5 INHALANT ORAL
Status: DISCONTINUED | OUTPATIENT
Start: 2023-03-07 | End: 2023-03-10 | Stop reason: HOSPADM

## 2023-03-07 RX ORDER — ATORVASTATIN CALCIUM 80 MG/1
80 TABLET, FILM COATED ORAL NIGHTLY
Status: DISCONTINUED | OUTPATIENT
Start: 2023-03-07 | End: 2023-03-10 | Stop reason: HOSPADM

## 2023-03-07 RX ORDER — ASPIRIN 300 MG/1
300 SUPPOSITORY RECTAL DAILY
Status: DISCONTINUED | OUTPATIENT
Start: 2023-03-07 | End: 2023-03-07

## 2023-03-07 RX ADMIN — CEFTRIAXONE SODIUM 1 G: 1 INJECTION, POWDER, FOR SOLUTION INTRAMUSCULAR; INTRAVENOUS at 20:34

## 2023-03-07 RX ADMIN — Medication 250 MG: at 12:26

## 2023-03-07 RX ADMIN — SENNOSIDES 1 TABLET: 8.6 TABLET, FILM COATED ORAL at 20:34

## 2023-03-07 RX ADMIN — TRAZODONE HYDROCHLORIDE 50 MG: 50 TABLET ORAL at 20:34

## 2023-03-07 RX ADMIN — GUAIFENESIN 400 MG: 200 SOLUTION ORAL at 10:37

## 2023-03-07 RX ADMIN — SODIUM CHLORIDE 75 ML/HR: 9 INJECTION, SOLUTION INTRAVENOUS at 06:50

## 2023-03-07 RX ADMIN — METOPROLOL TARTRATE 50 MG: 50 TABLET, FILM COATED ORAL at 20:34

## 2023-03-07 RX ADMIN — Medication 300 MG: at 21:57

## 2023-03-07 RX ADMIN — MIRTAZAPINE 15 MG: 15 TABLET, FILM COATED ORAL at 21:57

## 2023-03-07 RX ADMIN — METOPROLOL TARTRATE 50 MG: 50 TABLET, FILM COATED ORAL at 12:25

## 2023-03-07 RX ADMIN — GUAIFENESIN 400 MG: 200 SOLUTION ORAL at 19:55

## 2023-03-07 RX ADMIN — Medication 250 MG: at 21:57

## 2023-03-07 RX ADMIN — ATORVASTATIN CALCIUM 80 MG: 80 TABLET, FILM COATED ORAL at 20:34

## 2023-03-07 RX ADMIN — LANSOPRAZOLE 15 MG: 15 TABLET, ORALLY DISINTEGRATING, DELAYED RELEASE ORAL at 12:20

## 2023-03-07 RX ADMIN — BUDESONIDE 0.5 MG: 0.5 INHALANT ORAL at 23:20

## 2023-03-07 RX ADMIN — ASPIRIN 300 MG: 300 SUPPOSITORY RECTAL at 08:18

## 2023-03-07 RX ADMIN — CEFTRIAXONE SODIUM 1 G: 1 INJECTION, POWDER, FOR SOLUTION INTRAMUSCULAR; INTRAVENOUS at 03:05

## 2023-03-07 RX ADMIN — SODIUM CHLORIDE 75 ML/HR: 9 INJECTION, SOLUTION INTRAVENOUS at 20:34

## 2023-03-07 NOTE — CONSULTS
Neurology Consult Note    Consult Date: 3/7/2023    Referring MD: Antonio Chamorro MD    Reason for Consult I have been asked to see the patient in neurological consultation to render advice and opinion regarding altered mental status    Kiya Cramer is a 87 y.o. female with past medical history of hypertension, PAF, recent admission to [facility] for encephalopathy related to UTI.  Since that time she has had gradually improving dysphagia.  She had recently gone home from rehab and had progressed from liquid nutrition to eating solid food.  Over the last week she developed worsening difficulty managing her secretions with gradual decline in level of consciousness and willingness to eat.  Her caregiver noticed some intermittent cough along with the worsening secretions.  There was some associated generalized weakness no reported facial droop or unilateral weakness.  Patient at baseline has had some physical disability but does not carry a diagnosis of dementia.  She was brought to the hospital yesterday for persistent worsening of her mental status.  On presentation she did not have any lateralizing features.  MRI was done this morning and was negative for stroke.    Past Medical History:   Diagnosis Date   • Acquired hypothyroidism 7/8/2015   • Health care maintenance    • Heart murmur    • Hypertension    • Mitral regurgitation     Trace to mild per 2-D echocardiogram 3/25/2013   • Mitral valve prolapse     Mild per 2-D echocardiogram 12/30/2016-trace mitral valve regurgitation   • Palpitations 12/14/2016    Description: had a prior holter with 35 sec of AF; stopped dilt and pradaxa in 3/13; added metoprolol; more palp and will get event recorder; event recorder was normal   • Paroxysmal atrial fibrillation (HCC)     History of 35 second run of paroxysmal atrial fibrillation per Holter; The patient previously was on diltiazem and this was discontinued due to adverse effects; she was on Pradaxa but has an unsteady  "gait and this was discontinued; she has remained on metoprolol tartrate   • Right bundle branch block     Chronic   • 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) 12/14/2016    Description: NA was 130 in 3/13   • Tricuspid regurgitation     Mild to moderate per 2-D echocardiogram 12/30/2016   • Unsteady gait    • Vitamin D deficiency        Exam  /82   Pulse 71   Temp 97.5 °F (36.4 °C) (Oral)   Resp 16   Ht 172.7 cm (68\")   Wt 49.4 kg (108 lb 14.2 oz)   SpO2 97%   BMI 16.56 kg/m²   Gen: NAD, vitals reviewed  MS: Lethargic but awake, oriented, mildly impaired attention, language intact, no neglect  CN: visual acuity grossly normal, visual fields full, PERRL, EOMI, no facial droop, mild to moderate dysarthria, palate elevates symmetrically, tongue midline  Motor: 5/5 throughout upper and lower extremities, normal tone    DATA:    Lab Results   Component Value Date    GLUCOSE 90 03/07/2023    CALCIUM 9.0 03/07/2023     (L) 03/07/2023    K 3.8 03/07/2023    CO2 30.0 (H) 03/07/2023    CL 91 (L) 03/07/2023    BUN 20 03/07/2023    CREATININE 0.40 (L) 03/07/2023    EGFRIFNONA 90 09/06/2019    BCR 50.0 (H) 03/07/2023    ANIONGAP 12.0 03/07/2023     Lab Results   Component Value Date    WBC 7.82 03/07/2023    HGB 11.6 (L) 03/07/2023    HCT 35.3 03/07/2023    MCV 93.6 03/07/2023     03/07/2023       Lab review: Sodium 133, hemoglobin 11.6    Imaging review: I personally reviewed her brain MRI performed today in the emergency department which does not show any evidence of acute stroke.  Radiology report reviewed.  They note mild to moderate small vessel disease.    Diagnoses:  Metabolic encephalopathy    Pre-stroke MRS: 3    Comment: 87-year-old female who had been recovering from a recent hospital admission in the fall 2022 and had just recently come home.  She developed worsening dysphagia and oral secretions and stopped eating, with associated depressed mental status and " generalized weakness.  Symptoms have been stable since presentation.  Primary team was concerned about UTI and/or pneumonia.  MRI does not show any evidence of stroke and her neurologic exam is reassuring    PLAN:  No further neurologic work-up needed.

## 2023-03-07 NOTE — ED NOTES
"Nursing report ED to floor  Kiya Cramer  87 y.o.  female    HPI :   Chief Complaint   Patient presents with    Speech Problem       Admitting doctor:   Antonio Chamorro MD    Admitting diagnosis:   The primary encounter diagnosis was Altered mental status, unspecified altered mental status type. Diagnoses of Urinary tract infection without hematuria, site unspecified and Slurred speech were also pertinent to this visit.    Code status:   Current Code Status       Date Active Code Status Order ID Comments User Context       3/7/2023 0335 CPR (Attempt to Resuscitate) [id number]  Lay Contreras APRN ED        Question Answer    Code Status (Patient has no pulse and is not breathing) CPR (Attempt to Resuscitate)    Medical Interventions (Patient has pulse or is breathing) Full Support    Release to patient Routine Release                    Allergies:   Cardizem [diltiazem hcl]    Isolation:   No active isolations    Intake and Output    Intake/Output Summary (Last 24 hours) at 3/7/2023 0408  Last data filed at 3/7/2023 0324  Gross per 24 hour   Intake 100 ml   Output --   Net 100 ml       Weight:       03/06/23 2324   Weight: 49.4 kg (108 lb 14.2 oz)       Most recent vitals:   Vitals:    03/06/23 2322 03/06/23 2324 03/07/23 0019   BP: 112/70     BP Location: Right arm     Patient Position: Lying     Pulse: 86     Resp: 16     Temp:   97.5 °F (36.4 °C)   TempSrc:   Oral   SpO2: 94%     Weight:  49.4 kg (108 lb 14.2 oz)    Height:  172.7 cm (68\")        Active LDAs/IV Access:   Lines, Drains & Airways       Active LDAs       Name Placement date Placement time Site Days    Peripheral IV 03/06/23 2323 Left Antecubital 03/06/23 2323  Antecubital  less than 1                    Labs (abnormal labs have a star):   Labs Reviewed   COMPREHENSIVE METABOLIC PANEL - Abnormal; Notable for the following components:       Result Value    Glucose 100 (*)     BUN 26 (*)     Sodium 129 (*)     Chloride 89 (*)     Alkaline " Phosphatase 149 (*)     BUN/Creatinine Ratio 40.6 (*)     All other components within normal limits    Narrative:     GFR Normal >60  Chronic Kidney Disease <60  Kidney Failure <15    The GFR formula is only valid for adults with stable renal function between ages 18 and 70.   URINALYSIS W/ MICROSCOPIC IF INDICATED (NO CULTURE) - Abnormal; Notable for the following components:    Color, UA Dark Yellow (*)     Ketones, UA 15 mg/dL (1+) (*)     Protein, UA Trace (*)     Leuk Esterase, UA Small (1+) (*)     All other components within normal limits   TROPONIN - Abnormal; Notable for the following components:    HS Troponin T 25 (*)     All other components within normal limits    Narrative:     High Sensitive Troponin T Reference Range:  <10.0 ng/L- Negative Female for AMI  <15.0 ng/L- Negative Male for AMI  >=10 - Abnormal Female indicating possible myocardial injury.  >=15 - Abnormal Male indicating possible myocardial injury.   Clinicians would have to utilize clinical acumen, EKG, Troponin, and serial changes to determine if it is an Acute Myocardial Infarction or myocardial injury due to an underlying chronic condition.        T4, FREE - Abnormal; Notable for the following components:    Free T4 2.28 (*)     All other components within normal limits    Narrative:     Results may be falsely increased if patient taking Biotin.     T3, FREE - Abnormal; Notable for the following components:    T3, Free 1.61 (*)     All other components within normal limits    Narrative:     Results may be falsely increased if patient taking Biotin.     CBC WITH AUTO DIFFERENTIAL - Abnormal; Notable for the following components:    Neutrophil % 76.1 (*)     Lymphocyte % 10.6 (*)     Immature Grans % 4.2 (*)     Neutrophils, Absolute 7.59 (*)     Immature Grans, Absolute 0.42 (*)     nRBC 0.3 (*)     All other components within normal limits   URINALYSIS, MICROSCOPIC ONLY - Abnormal; Notable for the following components:    WBC, UA 6-12  "(*)     Bacteria, UA Trace (*)     Squamous Epithelial Cells, UA 7-12 (*)     All other components within normal limits   HIGH SENSITIVITIY TROPONIN T 2HR - Abnormal; Notable for the following components:    HS Troponin T 18 (*)     Troponin T Delta -7 (*)     All other components within normal limits    Narrative:     High Sensitive Troponin T Reference Range:  <10.0 ng/L- Negative Female for AMI  <15.0 ng/L- Negative Male for AMI  >=10 - Abnormal Female indicating possible myocardial injury.  >=15 - Abnormal Male indicating possible myocardial injury.   Clinicians would have to utilize clinical acumen, EKG, Troponin, and serial changes to determine if it is an Acute Myocardial Infarction or myocardial injury due to an underlying chronic condition.        PROTIME-INR - Normal   APTT - Normal   BNP (IN-HOUSE) - Normal    Narrative:     Among patients with dyspnea, NT-proBNP is highly sensitive for the detection of acute congestive heart failure. In addition NT-proBNP of <300 pg/ml effectively rules out acute congestive heart failure with 99% negative predictive value.    Results may be falsely decreased if patient taking Biotin.     LACTIC ACID, PLASMA - Normal   PROCALCITONIN - Normal    Narrative:     As a Marker for Sepsis (Non-Neonates):    1. <0.5 ng/mL represents a low risk of severe sepsis and/or septic shock.  2. >2 ng/mL represents a high risk of severe sepsis and/or septic shock.    As a Marker for Lower Respiratory Tract Infections that require antibiotic therapy:    PCT on Admission    Antibiotic Therapy       6-12 Hrs later    >0.5                Strongly Recommended  >0.25 - <0.5        Recommended   0.1 - 0.25          Discouraged              Remeasure/reassess PCT  <0.1                Strongly Discouraged     Remeasure/reassess PCT    As 28 day mortality risk marker: \"Change in Procalcitonin Result\" (>80% or <=80%) if Day 0 (or Day 1) and Day 4 values are available. Refer to " [email or web address]    Change in PCT <=80%  A decrease of PCT levels below or equal to 80% defines a positive change in PCT test result representing a higher risk for 28-day all-cause mortality of patients diagnosed with severe sepsis for septic shock.    Change in PCT >80%  A decrease of PCT levels of more than 80% defines a negative change in PCT result representing a lower risk for 28-day all-cause mortality of patients diagnosed with severe sepsis or septic shock.      URINE DRUG SCREEN - Normal    Narrative:     Negative Thresholds Per Drugs Screened:    Amphetamines                 500 ng/ml  Barbiturates                 200 ng/ml  Benzodiazepines              100 ng/ml  Cocaine                      300 ng/ml  Methadone                    300 ng/ml  Opiates                      300 ng/ml  Oxycodone                    100 ng/ml  THC                           50 ng/ml    The Normal Value for all drugs tested is negative. This report includes final unconfirmed screening results to be used for medical treatment purposes only. Unconfirmed results must not be used for non-medical purposes such as employment or legal testing. Clinical consideration should be applied to any drug of abuse test, particularly when unconfirmed results are used.           MAGNESIUM - Normal   TSH - Normal   POCT GLUCOSE FINGERSTICK - Normal   BLOOD CULTURE   BLOOD CULTURE   URINE CULTURE   ETHANOL   CBC (NO DIFF)   COMPREHENSIVE METABOLIC PANEL   HEMOGLOBIN A1C   LIPID PANEL   TSH   SODIUM, URINE, RANDOM   CHLORIDE, URINE, RANDOM   OSMOLALITY, URINE   POCT GLUCOSE FINGERSTICK   POCT GLUCOSE FINGERSTICK   POCT GLUCOSE FINGERSTICK   POCT GLUCOSE FINGERSTICK   POCT GLUCOSE FINGERSTICK   CBC AND DIFFERENTIAL    Narrative:     The following orders were created for panel order CBC & Differential.  Procedure                               Abnormality         Status                     ---------                                -----------         ------                     CBC Auto Differential[[id number]]        Abnormal            Final result                 Please view results for these tests on the individual orders.       EKG:   ECG 12 Lead Altered Mental Status   Preliminary Result   HEART RATE= 73  bpm   RR Interval= 822  ms   IA Interval= 168  ms   P Horizontal Axis= -8  deg   P Front Axis= 59  deg   QRSD Interval= 143  ms   QT Interval= 396  ms   QRS Axis= -27  deg   T Wave Axis= -1  deg   - ABNORMAL ECG -   Sinus rhythm   Atrial premature complex   Left atrial enlargement   Right bundle branch block   Baseline wander in lead(s) V6   Electronically Signed By:    Date and Time of Study: 2023-03-07 00:15:26          Meds given in ED:   Medications   sodium chloride 0.9 % flush 10 mL (has no administration in time range)   sodium chloride 0.9 % infusion (has no administration in time range)   acetaminophen (TYLENOL) tablet 650 mg (has no administration in time range)     Or   acetaminophen (TYLENOL) suppository 650 mg (has no administration in time range)   aspirin tablet 325 mg (has no administration in time range)     Or   aspirin suppository 300 mg (has no administration in time range)   atorvastatin (LIPITOR) tablet 80 mg (has no administration in time range)   ondansetron (ZOFRAN) injection 4 mg (has no administration in time range)   bisacodyl (DULCOLAX) suppository 10 mg (has no administration in time range)   cefTRIAXone (ROCEPHIN) 1 g in sodium chloride 0.9 % 100 mL IVPB-VTB (0 g Intravenous Stopped 3/7/23 0324)       Imaging results:  CT Head Without Contrast    Result Date: 3/7/2023  No acute intracranial findings.  Radiation dose reduction techniques were utilized, including automated exposure control and exposure modulation based on body size.  This report was finalized on 3/7/2023 12:34 AM by Dr. Luz Maria Barboza M.D.      XR Chest 1 View    Result Date: 3/7/2023  Nonspecific left basilar consolidation. Pneumonia  cannot be excluded.  This report was finalized on 3/7/2023 12:16 AM by Dr. Luz Maria Barboza M.D.       Ambulatory status:   - assist x1-2 bed rest    Social issues:   Social History     Socioeconomic History    Marital status:    Tobacco Use    Smoking status: Never    Smokeless tobacco: Never    Tobacco comments:     caffeine use    Substance and Sexual Activity    Alcohol use: No    Drug use: No    Sexual activity: Defer       NIH Stroke Scale:  Interval: baseline      Lay John RN  03/07/23 04:08 EST

## 2023-03-07 NOTE — ED PROVIDER NOTES
EMERGENCY DEPARTMENT ENCOUNTER    Room Number:  35/35  Date of encounter:  3/7/2023  PCP: System, Provider Not In  Patient Care Team:  System, Provider Not In as PCP - General   Independent Historians: Patient, EMS    HPI:  Chief Complaint: Mental status change, slurred speech    A complete HPI/ROS/PMH/PSH/SH/FH are unobtainable due to: None    Chronic or social conditions impacting patient care (Social Determinants of Health): None  (Financial Resource Strain / Food Insecurity / Transportation Needs / Physical Activity / Stress / Social Connections / Intimate Partner Violence / Housing Stability)    Context: Kiya Cramer is a 87 y.o. female who presents to the ED per EMS- they report that caretakers at her home state that she had some mental status change about an hour prior to arrival in the ED- sitting up patient became less active than normal and developed slurred speech.  Patient complaining only of low back pain which she states has been going on ever since she broke her hip several years ago.  Patient is alert awake and oriented.  I do note some slurring of speech.    Review of prior external notes (non-ED): I reviewed discharge summary from [facility] from 11/26/2022 at that time she was admitted with altered mental status found to have a UTI at that time.    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    PAST SURGICAL HISTORY  Past Surgical History:   Procedure Laterality Date   • HYSTERECTOMY           FAMILY HISTORY  Family History   Problem Relation Age of Onset   • Heart disease Mother    • Heart disease Father    • Heart failure Father    • No Known Problems Maternal Grandmother    • No Known Problems Maternal Grandfather    • No Known Problems Paternal Grandmother    • No Known Problems Paternal Grandfather          SOCIAL HISTORY  Social History     Socioeconomic History   • Marital status:    Tobacco Use   • Smoking status: Never   • Smokeless tobacco: Never   • Tobacco comments:     caffeine use    Substance and Sexual Activity   • Alcohol use: No   • Drug use: No   • Sexual activity: Defer         ALLERGIES  Cardizem [diltiazem hcl]        REVIEW OF SYSTEMS  Review of Systems     All systems reviewed and negative except for those discussed in HPI.       PHYSICAL EXAM    I have reviewed the triage vital signs and nursing notes.    ED Triage Vitals [03/06/23 2322]   Temp Heart Rate Resp BP SpO2   -- 86 16 112/70 94 %      Temp src Heart Rate Source Patient Position BP Location FiO2 (%)   -- Monitor Lying Right arm --       Physical Exam  GENERAL: alert, no acute distress  SKIN: Warm, dry  HENT: Normocephalic, atraumatic  EYES: no scleral icterus  CV: regular rhythm, regular rate  RESPIRATORY: normal effort, lungs clear  ABDOMEN: soft, nontender, nondistended  MUSCULOSKELETAL: no deformity  NEURO: alert and oriented x4, moves all extremities, follows commands, speech is slightly slurred, cranial nerves otherwise intact, coordination intact          LAB RESULTS  Recent Results (from the past 24 hour(s))   POC Glucose Once    Collection Time: 03/06/23 11:26 PM    Specimen: Blood   Result  Value Ref Range    Glucose 110 70 - 130 mg/dL   Comprehensive Metabolic Panel    Collection Time: 03/06/23 11:51 PM    Specimen: Arm, Left; Blood   Result Value Ref Range    Glucose 100 (H) 65 - 99 mg/dL    BUN 26 (H) 8 - 23 mg/dL    Creatinine 0.64 0.57 - 1.00 mg/dL    Sodium 129 (L) 136 - 145 mmol/L    Potassium 3.7 3.5 - 5.2 mmol/L    Chloride 89 (L) 98 - 107 mmol/L    CO2 29.0 22.0 - 29.0 mmol/L    Calcium 9.9 8.6 - 10.5 mg/dL    Total Protein 7.2 6.0 - 8.5 g/dL    Albumin 3.6 3.5 - 5.2 g/dL    ALT (SGPT) 14 1 - 33 U/L    AST (SGOT) 15 1 - 32 U/L    Alkaline Phosphatase 149 (H) 39 - 117 U/L    Total Bilirubin 0.3 0.0 - 1.2 mg/dL    Globulin 3.6 gm/dL    A/G Ratio 1.0 g/dL    BUN/Creatinine Ratio 40.6 (H) 7.0 - 25.0    Anion Gap 11.0 5.0 - 15.0 mmol/L    eGFR 86.7 >60.0 mL/min/1.73   Protime-INR    Collection Time: 03/06/23 11:51 PM    Specimen: Arm, Left; Blood   Result Value Ref Range    Protime 13.1 11.7 - 14.2 Seconds    INR 0.98 0.90 - 1.10   aPTT    Collection Time: 03/06/23 11:51 PM    Specimen: Arm, Left; Blood   Result Value Ref Range    PTT 33.2 22.7 - 35.4 seconds   BNP    Collection Time: 03/06/23 11:51 PM    Specimen: Arm, Left; Blood   Result Value Ref Range    proBNP 351.0 0.0 - 1,800.0 pg/mL   High Sensitivity Troponin T    Collection Time: 03/06/23 11:51 PM    Specimen: Arm, Left; Blood   Result Value Ref Range    HS Troponin T 25 (H) <10 ng/L   Lactic Acid, Plasma    Collection Time: 03/06/23 11:51 PM    Specimen: Arm, Left; Blood   Result Value Ref Range    Lactate 1.3 0.5 - 2.0 mmol/L   Procalcitonin    Collection Time: 03/06/23 11:51 PM    Specimen: Arm, Left; Blood   Result Value Ref Range    Procalcitonin 0.07 0.00 - 0.25 ng/mL   Ethanol    Collection Time: 03/06/23 11:51 PM    Specimen: Arm, Left; Blood   Result Value Ref Range    Ethanol <10 0 - 10 mg/dL    Ethanol % <0.010 %   Magnesium    Collection Time: 03/06/23 11:51 PM    Specimen: Arm, Left; Blood   Result Value Ref Range     Magnesium 2.1 1.6 - 2.4 mg/dL   TSH    Collection Time: 03/06/23 11:51 PM    Specimen: Arm, Left; Blood   Result Value Ref Range    TSH 1.700 0.270 - 4.200 uIU/mL   T4, Free    Collection Time: 03/06/23 11:51 PM    Specimen: Arm, Left; Blood   Result Value Ref Range    Free T4 2.28 (H) 0.93 - 1.70 ng/dL   T3, Free    Collection Time: 03/06/23 11:51 PM    Specimen: Arm, Left; Blood   Result Value Ref Range    T3, Free 1.61 (L) 2.00 - 4.40 pg/mL   CBC Auto Differential    Collection Time: 03/06/23 11:51 PM    Specimen: Arm, Left; Blood   Result Value Ref Range    WBC 9.98 3.40 - 10.80 10*3/mm3    RBC 3.97 3.77 - 5.28 10*6/mm3    Hemoglobin 12.5 12.0 - 15.9 g/dL    Hematocrit 36.3 34.0 - 46.6 %    MCV 91.4 79.0 - 97.0 fL    MCH 31.5 26.6 - 33.0 pg    MCHC 34.4 31.5 - 35.7 g/dL    RDW 13.2 12.3 - 15.4 %    RDW-SD 44.3 37.0 - 54.0 fl    MPV 10.1 6.0 - 12.0 fL    Platelets 230 140 - 450 10*3/mm3    Neutrophil % 76.1 (H) 42.7 - 76.0 %    Lymphocyte % 10.6 (L) 19.6 - 45.3 %    Monocyte % 8.4 5.0 - 12.0 %    Eosinophil % 0.5 0.3 - 6.2 %    Basophil % 0.2 0.0 - 1.5 %    Immature Grans % 4.2 (H) 0.0 - 0.5 %    Neutrophils, Absolute 7.59 (H) 1.70 - 7.00 10*3/mm3    Lymphocytes, Absolute 1.06 0.70 - 3.10 10*3/mm3    Monocytes, Absolute 0.84 0.10 - 0.90 10*3/mm3    Eosinophils, Absolute 0.05 0.00 - 0.40 10*3/mm3    Basophils, Absolute 0.02 0.00 - 0.20 10*3/mm3    Immature Grans, Absolute 0.42 (H) 0.00 - 0.05 10*3/mm3    nRBC 0.3 (H) 0.0 - 0.2 /100 WBC   Urinalysis With Microscopic If Indicated (No Culture) - Straight Cath    Collection Time: 03/06/23 11:59 PM    Specimen: Straight Cath; Urine   Result Value Ref Range    Color, UA Dark Yellow (A) Yellow, Straw    Appearance, UA Clear Clear    pH, UA 6.0 5.0 - 8.0    Specific Gravity, UA 1.022 1.005 - 1.030    Glucose, UA Negative Negative    Ketones, UA 15 mg/dL (1+) (A) Negative    Bilirubin, UA Negative Negative    Blood, UA Negative Negative    Protein, UA Trace (A) Negative     Leuk Esterase, UA Small (1+) (A) Negative    Nitrite, UA Negative Negative    Urobilinogen, UA 1.0 E.U./dL 0.2 - 1.0 E.U./dL   Urine Drug Screen - Straight Cath    Collection Time: 03/06/23 11:59 PM    Specimen: Straight Cath; Urine   Result Value Ref Range    Amphet/Methamphet, Screen Negative Negative    Barbiturates Screen, Urine Negative Negative    Benzodiazepine Screen, Urine Negative Negative    Cocaine Screen, Urine Negative Negative    Opiate Screen Negative Negative    THC, Screen, Urine Negative Negative    Methadone Screen, Urine Negative Negative    Oxycodone Screen, Urine Negative Negative   Urinalysis, Microscopic Only - Straight Cath    Collection Time: 03/06/23 11:59 PM    Specimen: Straight Cath; Urine   Result Value Ref Range    RBC, UA None Seen None Seen, 0-2 /HPF    WBC, UA 6-12 (A) None Seen, 0-2 /HPF    Bacteria, UA Trace (A) None Seen /HPF    Squamous Epithelial Cells, UA 7-12 (A) None Seen, 0-2 /HPF    Hyaline Casts, UA 13-20 None Seen /LPF    Methodology Manual Light Microscopy    ECG 12 Lead Altered Mental Status    Collection Time: 03/07/23 12:15 AM   Result Value Ref Range    QT Interval 396 ms   High Sensitivity Troponin T 2Hr    Collection Time: 03/07/23  2:58 AM    Specimen: Arm, Right; Blood   Result Value Ref Range    HS Troponin T 18 (H) <10 ng/L    Troponin T Delta -7 (L) >=-4 - <+4 ng/L   CBC (No Diff)    Collection Time: 03/07/23  6:29 AM    Specimen: Arm, Left; Blood   Result Value Ref Range    WBC 7.82 3.40 - 10.80 10*3/mm3    RBC 3.77 3.77 - 5.28 10*6/mm3    Hemoglobin 11.6 (L) 12.0 - 15.9 g/dL    Hematocrit 35.3 34.0 - 46.6 %    MCV 93.6 79.0 - 97.0 fL    MCH 30.8 26.6 - 33.0 pg    MCHC 32.9 31.5 - 35.7 g/dL    RDW 13.5 12.3 - 15.4 %    RDW-SD 46.5 37.0 - 54.0 fl    MPV 9.2 6.0 - 12.0 fL    Platelets 246 140 - 450 10*3/mm3   Comprehensive Metabolic Panel    Collection Time: 03/07/23  6:29 AM    Specimen: Arm, Left; Blood   Result Value Ref Range    Glucose 90 65 - 99 mg/dL     BUN 20 8 - 23 mg/dL    Creatinine 0.40 (L) 0.57 - 1.00 mg/dL    Sodium 133 (L) 136 - 145 mmol/L    Potassium 3.8 3.5 - 5.2 mmol/L    Chloride 91 (L) 98 - 107 mmol/L    CO2 30.0 (H) 22.0 - 29.0 mmol/L    Calcium 9.0 8.6 - 10.5 mg/dL    Total Protein 6.5 6.0 - 8.5 g/dL    Albumin 3.1 (L) 3.5 - 5.2 g/dL    ALT (SGPT) 8 1 - 33 U/L    AST (SGOT) 14 1 - 32 U/L    Alkaline Phosphatase 132 (H) 39 - 117 U/L    Total Bilirubin 0.2 0.0 - 1.2 mg/dL    Globulin 3.4 gm/dL    A/G Ratio 0.9 g/dL    BUN/Creatinine Ratio 50.0 (H) 7.0 - 25.0    Anion Gap 12.0 5.0 - 15.0 mmol/L    eGFR 97.1 >60.0 mL/min/1.73   Hemoglobin A1c    Collection Time: 03/07/23  6:29 AM    Specimen: Arm, Left; Blood   Result Value Ref Range    Hemoglobin A1C 5.00 4.80 - 5.60 %   Lipid Panel    Collection Time: 03/07/23  6:29 AM    Specimen: Arm, Left; Blood   Result Value Ref Range    Total Cholesterol 160 0 - 200 mg/dL    Triglycerides 69 0 - 150 mg/dL    HDL Cholesterol 68 (H) 40 - 60 mg/dL    LDL Cholesterol  79 0 - 100 mg/dL    VLDL Cholesterol 13 5 - 40 mg/dL    LDL/HDL Ratio 1.15    TSH    Collection Time: 03/07/23  6:29 AM    Specimen: Arm, Left; Blood   Result Value Ref Range    TSH 0.650 0.270 - 4.200 uIU/mL       Ordered the above labs and independently reviewed the results.        RADIOLOGY  CT Head Without Contrast    Result Date: 3/7/2023  CT HEAD WITHOUT CONTRAST  HISTORY: Altered mental status  COMPARISON: None available.  TECHNIQUE: Axial CT imaging was obtained through the brain. No IV contrast was administered.  FINDINGS: No acute intracranial hemorrhage is seen. There is minimal atrophy. There is some periventricular and deep white matter microangiopathic change. There is no midline shift or mass effect. Old infarct is suspected within the anterior limb of the left internal capsule. The paranasal sinuses appear clear. There is mild partial opacification of the mastoid air cells.      No acute intracranial findings.  Radiation dose  reduction techniques were utilized, including automated exposure control and exposure modulation based on body size.  This report was finalized on 3/7/2023 12:34 AM by Dr. Luz Maria Barboza M.D.      XR Chest 1 View    Result Date: 3/7/2023  SINGLE VIEW OF THE CHEST  HISTORY: Shortness of air  COMPARISON: 03/24/2013  FINDINGS: Cardiomegaly is present. Patient has areas of chronic scarring within both lungs. There is calcification of the aorta. There is left basilar consolidation. Pneumonia is not excluded. There is some scarring versus atelectasis noted at the right lung base. Lung volumes are overall diminished.      Nonspecific left basilar consolidation. Pneumonia cannot be excluded.  This report was finalized on 3/7/2023 12:16 AM by Dr. Luz Maria Barboza M.D.        I ordered the above noted radiological studies. Reviewed by me and discussed with radiologist.  See dictation for official radiology interpretation.      PROCEDURES    Procedures      MEDICATIONS GIVEN IN ER    Medications   sodium chloride 0.9 % flush 10 mL (has no administration in time range)   sodium chloride 0.9 % infusion (75 mL/hr Intravenous New Bag 3/7/23 0649)   acetaminophen (TYLENOL) tablet 650 mg (has no administration in time range)     Or   acetaminophen (TYLENOL) suppository 650 mg (has no administration in time range)   aspirin tablet 325 mg (has no administration in time range)     Or   aspirin suppository 300 mg (has no administration in time range)   atorvastatin (LIPITOR) tablet 80 mg (has no administration in time range)   ondansetron (ZOFRAN) injection 4 mg (has no administration in time range)   bisacodyl (DULCOLAX) suppository 10 mg (has no administration in time range)   cefTRIAXone (ROCEPHIN) 1 g in sodium chloride 0.9 % 100 mL IVPB-VTB (0 g Intravenous Stopped 3/7/23 0324)         ORDERS PLACED DURING THIS VISIT:  Orders Placed This Encounter   Procedures   • Blood Culture - Blood,   • Blood Culture - Blood,   • Urine  Culture - Urine,   • Respiratory Panel PCR w/COVID-19(SARS-CoV-2) [facility] In-House, NP Swab in UTM/VTM, 3-4 HR TAT - Swab, Nasopharynx   • XR Chest 1 View   • CT Head Without Contrast   • MRI Brain Without Contrast   • CT Angiogram Head   • CT Angiogram Neck   • Comprehensive Metabolic Panel   • Protime-INR   • aPTT   • Urinalysis With Microscopic If Indicated (No Culture) - Urine, Clean Catch   • BNP   • High Sensitivity Troponin T   • Lactic Acid, Plasma   • Procalcitonin   • Ethanol   • Urine Drug Screen - Urine, Clean Catch   • Magnesium   • TSH   • T4, Free   • T3, Free   • CBC Auto Differential   • Urinalysis, Microscopic Only - Urine, Clean Catch   • High Sensitivity Troponin T 2Hr   • CBC (No Diff)   • Comprehensive Metabolic Panel   • Hemoglobin A1c   • Lipid Panel   • TSH   • Sodium, Urine, Random - Urine, Clean Catch   • Chloride, Urine, Random - Urine, Clean Catch   • Osmolality, Urine - Urine, Clean Catch   • NPO Diet NPO Type: Strict NPO   • Monitor Blood Pressure   • Vital Signs   • Pulse Oximetry, Continuous   • Cardiac Monitoring   • Notify physician of changes in level of consciousness, worsening of stroke symptoms, acute headache or severe nausea and vomiting or any of the following vital sign parameters:   • Nursing Dysphagia Screening   • RN to Place Order SLP Consult - Eval & Treat Choosing Reason of RN Dysphagia Screen Failed   • Nurse to Call MD or Nutrition Services for Diet if Patient Passes Dysphagia Screen   • Intake and Output   • Neuro Checks   • NIHSS Assessment   • Order CT Head Without Contrast for Neurological Decline   • Provide Stroke Education Material   • Tobacco Cessation Education   • Place Sequential Compression Device   • Maintain Sequential Compression Device   • Activity As Tolerated   • Code Status and Medical Interventions:   • LHA (on-call MD unless specified) Details   • Notify Stroke Coordinator   • Inpatient Rehab Admission Consult   • Consult to  Case Management    • Consult to Diabetes Educator   • Inpatient Neurology Consult Stroke   • OT Consult: Eval & Treat   • PT Consult: Eval & Treat as tolerated   • Oxygen Therapy-   • SLP Consult: Eval & Treat Communication Disorder   • SLP Consult: Eval & Treat RN Dysphagia Screen Failed   • POC Glucose Once   • POC Glucose Once   • POC Glucose Q6H   • ECG 12 Lead Altered Mental Status   • ECG 12 Lead   • Adult transthoracic echo complete   • Insert Peripheral IV   • Initiate Observation Status   • Fall Precautions   • CBC & Differential         PROGRESS, DATA ANALYSIS, CONSULTS, AND MEDICAL DECISION MAKING    All labs have been independently interpreted by me.  All radiology studies have been reviewed by me and discussed with radiologist dictating the report.   EKG's independently viewed and interpreted by me.  Discussion below represents my analysis of pertinent findings related to patient's condition, differential diagnosis, treatment plan and final disposition.    My differential diagnosis for includes but is not limited to:  Hypoglycemia, hyperglycemia, DKA, overdose, ethanol intoxication, thiamine deficiency, niacin deficiency, hypothymia, hyperviscosity, Jonathon’s disease, hyponatremia, hypernatremia, liver failure, kidney failure, hyper or hypothyroid, no insufficiency, hypoxia, hypercarbia, carbon monoxide poisoning, postanoxic encephalopathy, ischemic stroke, intracranial bleed, subarachnoid hemorrhage, brain tumor, closed head injury, epidural hematoma, epidural hematoma, seizure activity, postictal state, syncopal episode, disseminated encephalomyelitis, central pontine myelinolysis, post cardiac arrest, bacterial meningitis, viral meningitis, fungal meningitis, encephalitis, brain abscess, subdural empyema, hysteria, catatonic state, malingering, hypertensive encephalopathy, vasculitis, TTP, DIC          ED Course as of 03/07/23 0740   Mon Mar 06, 2023   2333 D/w Dr Banerjee - NIHSS 1 -  pt is not interventional candidate at this time.  Rec's CT wo of brain.  Will continue to monitor. [TJ]      ED Course User Index  [TJ] Mick Boucher MD         PPE: The patient wore a mask and I wore an N95 mask throughout the entire patient encounter.       AS OF 07:40 EST VITALS:    BP - 134/66  HR - 72  TEMP - 97.5 °F (36.4 °C) (Oral)  O2 SATS - 96%        DIAGNOSIS  Final diagnoses:   Altered mental status, unspecified altered mental status type   Urinary tract infection without hematuria, site unspecified   Slurred speech         DISPOSITION  ED Disposition     ED Disposition   Decision to Admit    Condition   --    Comment   Level of Care: Telemetry [5]   Diagnosis: Altered mental status, unspecified altered mental status type [[id number]]   Admitting Physician: DAVID KRAUS [[id number]]                  Note Disclaimer: At [facility], we believe that sharing information builds trust and better relationships. You are receiving this note because you recently visited [facility]. It is possible you will see health information before a provider has talked with you about it. This kind of information can be easy to misunderstand. To help you fully understand what it means for your health, we urge you to discuss this note with your provider.       Mick Boucher MD  03/07/23 0449

## 2023-03-07 NOTE — H&P
"    Patient Name:  Kiya Cramer  YOB: 1935  MRN:  [id number]  Admit Date:  3/6/2023  Patient Care Team:  System, Provider Not In as PCP - General      Subjective   History Present Illness     Chief Complaint   Patient presents with   • Speech Problem     History of Present Illness  Ms. Cramer is a 87 y.o. with a past medical history of hypothyroidism, dysphagia requiring PEG tube for nutrition, paroxysmal atrial fibrillation not on anticoagulation as an outpatient to the hospital with slurred speech and decreased activity over the last week.  Caregiver, Berkley Smith, at bedside.  States that patient has had some increased \"rattling\" in her lungs.  And that she intermittently has thick sputum spells where she takes Mucinex for the days and been going on for over 8 months at least.  Patient denies any fevers, chills, chest pain, abdominal pain, nausea, vomiting.  Berkley who has been her caregiver for the last 8 months states that the patient is back to her baseline.    Review of Systems   Constitutional: Positive for fatigue. Negative for chills and fever.   HENT: Negative for rhinorrhea and sore throat.    Respiratory: Negative for cough and shortness of breath.    Cardiovascular: Negative for chest pain and leg swelling.   Gastrointestinal: Negative for abdominal pain, constipation, diarrhea, nausea and vomiting.   Genitourinary: Negative for dysuria, frequency and urgency.   Musculoskeletal: Negative for back pain and myalgias.   Skin: Negative for rash.   Neurological: Negative for dizziness and headaches.        Personal History     Past Medical History:   Diagnosis Date   • Acquired hypothyroidism 7/8/2015   • Health care maintenance    • Heart murmur    • Hypertension    • Mitral regurgitation     Trace to mild per 2-D echocardiogram 3/25/2013   • Mitral valve prolapse     Mild per 2-D echocardiogram 12/30/2016-trace mitral valve regurgitation   • Palpitations 12/14/2016    Description: had a " prior holter with 35 sec of AF; stopped dilt and pradaxa in 3/13; added metoprolol; more palp and will get event recorder; event recorder was normal   • Paroxysmal atrial fibrillation (HCC)     History of 35 second run of paroxysmal atrial fibrillation per Holter; The patient previously was on diltiazem and this was discontinued due to adverse effects; she was on Pradaxa but has an unsteady gait and this was discontinued; she has remained on metoprolol tartrate   • Right bundle branch block     Chronic   • 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) 12/14/2016    Description: NA was 130 in 3/13   • Tricuspid regurgitation     Mild to moderate per 2-D echocardiogram 12/30/2016   • Unsteady gait    • Vitamin D deficiency      Past Surgical History:   Procedure Laterality Date   • HYSTERECTOMY       Family History   Problem Relation Age of Onset   • Heart disease Mother    • Heart disease Father    • Heart failure Father    • No Known Problems Maternal Grandmother    • No Known Problems Maternal Grandfather    • No Known Problems Paternal Grandmother    • No Known Problems Paternal Grandfather      Social History     Tobacco Use   • Smoking status: Never   • Smokeless tobacco: Never   • Tobacco comments:     caffeine use    Substance Use Topics   • Alcohol use: No   • Drug use: No     No current facility-administered medications on file prior to encounter.     Current Outpatient Medications on File Prior to Encounter   Medication Sig Dispense Refill   • levothyroxine (SYNTHROID, LEVOTHROID) 25 MCG tablet Take 2 tablets by mouth Daily.     • metoprolol tartrate (LOPRESSOR) 50 MG tablet Take 1 tablet by mouth 2 (Two) Times a Day. 180 tablet 2   • mirtazapine (REMERON) 30 MG tablet Take 15 mg by mouth Every Night.     • traZODone (DESYREL) 50 MG tablet Take 1 tablet by mouth Every Night.     • aspirin 81 MG EC tablet Take 1 tablet by mouth Daily.     • BELSOMRA 10 MG tablet Take 10 mg by mouth Daily.      • [DISCONTINUED] alendronate (FOSAMAX) 70 MG tablet Take 1 tablet by mouth Every 7 (Seven) Days.     • [DISCONTINUED] clonazePAM (KlonoPIN) 0.5 MG tablet Take 1 tablet by mouth Daily.     • [DISCONTINUED] doxepin (SINEquan) 10 MG capsule Take 2 capsules by mouth Every Night. (Patient not taking: Reported on 3/7/2023)       Allergies   Allergen Reactions   • Cardizem [Diltiazem Hcl]        Objective    Objective     Vital Signs  Temp:  [97.5 °F (36.4 °C)] 97.5 °F (36.4 °C)  Heart Rate:  [67-86] 78  Resp:  [16] 16  BP: (107-140)/(62-72) 140/67  SpO2:  [94 %-96 %] 94 %  on   ;   Device (Oxygen Therapy): room air  Body mass index is 16.56 kg/m².    Physical Exam  Constitutional:       General: She is not in acute distress.     Appearance: She is not diaphoretic.   HENT:      Head: Normocephalic and atraumatic.      Mouth/Throat:      Mouth: Mucous membranes are moist.      Pharynx: Oropharynx is clear.   Eyes:      Extraocular Movements: Extraocular movements intact.   Cardiovascular:      Rate and Rhythm: Normal rate and regular rhythm.      Heart sounds: No murmur heard.  Pulmonary:      Effort: Pulmonary effort is normal. No respiratory distress.      Breath sounds: Rhonchi present.   Abdominal:      General: Abdomen is flat. Bowel sounds are normal. There is no distension.      Palpations: Abdomen is soft.      Tenderness: There is no abdominal tenderness.      Comments: PEG tube intact   Musculoskeletal:         General: No swelling or deformity. Normal range of motion.   Skin:     General: Skin is warm and dry.   Neurological:      General: No focal deficit present.      Mental Status: She is alert and oriented to person, place, and time. Mental status is at baseline.      Comments: At baseline per caregiver at bedside         Results Review:  I reviewed the patient's new clinical results.  I reviewed the patient's new imaging results and agree with the interpretation.  I reviewed the patient's other test  results and agree with the interpretation  I personally viewed and interpreted the patient's EKG/Telemetry data  Discussed with ED provider.    Lab Results (last 24 hours)     Procedure Component Value Units Date/Time    POC Glucose Once [[id number]]  (Normal) Collected: 03/06/23 2326    Specimen: Blood Updated: 03/06/23 2327     Glucose 110 mg/dL      Comment: Meter: [id number] : [id number] [facility]       CBC & Differential [[id number]]  (Abnormal) Collected: 03/06/23 2351    Specimen: Blood from Arm, Left Updated: 03/07/23 0019    Narrative:      The following orders were created for panel order CBC & Differential.  Procedure                               Abnormality         Status                     ---------                               -----------         ------                     CBC Auto Differential[[id number]]        Abnormal            Final result                 Please view results for these tests on the individual orders.    Comprehensive Metabolic Panel [[id number]]  (Abnormal) Collected: 03/06/23 2351    Specimen: Blood from Arm, Left Updated: 03/07/23 0139     Glucose 100 mg/dL      BUN 26 mg/dL      Creatinine 0.64 mg/dL      Sodium 129 mmol/L      Potassium 3.7 mmol/L      Chloride 89 mmol/L      CO2 29.0 mmol/L      Calcium 9.9 mg/dL      Total Protein 7.2 g/dL      Albumin 3.6 g/dL      ALT (SGPT) 14 U/L      AST (SGOT) 15 U/L      Alkaline Phosphatase 149 U/L      Total Bilirubin 0.3 mg/dL      Globulin 3.6 gm/dL      A/G Ratio 1.0 g/dL      BUN/Creatinine Ratio 40.6     Anion Gap 11.0 mmol/L      eGFR 86.7 mL/min/1.73     Narrative:      GFR Normal >60  Chronic Kidney Disease <60  Kidney Failure <15    The GFR formula is only valid for adults with stable renal function between ages 18 and 70.    Protime-INR [[id number]]  (Normal) Collected: 03/06/23 2351    Specimen: Blood from Arm, Left Updated: 03/07/23 0032     Protime 13.1 Seconds      INR 0.98    aPTT [[id number]]   (Normal) Collected: 03/06/23 2351    Specimen: Blood from Arm, Left Updated: 03/07/23 0032     PTT 33.2 seconds     BNP [[id number]]  (Normal) Collected: 03/06/23 2351    Specimen: Blood from Arm, Left Updated: 03/07/23 0146     proBNP 351.0 pg/mL     Narrative:      Among patients with dyspnea, NT-proBNP is highly sensitive for the detection of acute congestive heart failure. In addition NT-proBNP of <300 pg/ml effectively rules out acute congestive heart failure with 99% negative predictive value.    Results may be falsely decreased if patient taking Biotin.      High Sensitivity Troponin T [[id number]]  (Abnormal) Collected: 03/06/23 2351    Specimen: Blood from Arm, Left Updated: 03/07/23 0146     HS Troponin T 25 ng/L     Narrative:      High Sensitive Troponin T Reference Range:  <10.0 ng/L- Negative Female for AMI  <15.0 ng/L- Negative Male for AMI  >=10 - Abnormal Female indicating possible myocardial injury.  >=15 - Abnormal Male indicating possible myocardial injury.   Clinicians would have to utilize clinical acumen, EKG, Troponin, and serial changes to determine if it is an Acute Myocardial Infarction or myocardial injury due to an underlying chronic condition.         Lactic Acid, Plasma [[id number]]  (Normal) Collected: 03/06/23 2351    Specimen: Blood from Arm, Left Updated: 03/07/23 0047     Lactate 1.3 mmol/L     Procalcitonin [[id number]]  (Normal) Collected: 03/06/23 2351    Specimen: Blood from Arm, Left Updated: 03/07/23 0146     Procalcitonin 0.07 ng/mL     Narrative:      As a Marker for Sepsis (Non-Neonates):    1. <0.5 ng/mL represents a low risk of severe sepsis and/or septic shock.  2. >2 ng/mL represents a high risk of severe sepsis and/or septic shock.    As a Marker for Lower Respiratory Tract Infections that require antibiotic therapy:    PCT on Admission    Antibiotic Therapy       6-12 Hrs later    >0.5                Strongly Recommended  >0.25 - <0.5        Recommended   0.1 - 0.25      "     Discouraged              Remeasure/reassess PCT  <0.1                Strongly Discouraged     Remeasure/reassess PCT    As 28 day mortality risk marker: \"Change in Procalcitonin Result\" (>80% or <=80%) if Day 0 (or Day 1) and Day 4 values are available. Refer to [email or web address]    Change in PCT <=80%  A decrease of PCT levels below or equal to 80% defines a positive change in PCT test result representing a higher risk for 28-day all-cause mortality of patients diagnosed with severe sepsis for septic shock.    Change in PCT >80%  A decrease of PCT levels of more than 80% defines a negative change in PCT result representing a lower risk for 28-day all-cause mortality of patients diagnosed with severe sepsis or septic shock.       Ethanol [[id number]] Collected: 03/06/23 2351    Specimen: Blood from Arm, Left Updated: 03/07/23 0139     Ethanol <10 mg/dL      Ethanol % <0.010 %     Magnesium [[id number]]  (Normal) Collected: 03/06/23 2351    Specimen: Blood from Arm, Left Updated: 03/07/23 0139     Magnesium 2.1 mg/dL     TSH [[id number]]  (Normal) Collected: 03/06/23 2351    Specimen: Blood from Arm, Left Updated: 03/07/23 0146     TSH 1.700 uIU/mL     T4, Free [[id number]]  (Abnormal) Collected: 03/06/23 2351    Specimen: Blood from Arm, Left Updated: 03/07/23 0146     Free T4 2.28 ng/dL     Narrative:      Results may be falsely increased if patient taking Biotin.      T3, Free [[id number]]  (Abnormal) Collected: 03/06/23 2351    Specimen: Blood from Arm, Left Updated: 03/07/23 0146     T3, Free 1.61 pg/mL     Narrative:      Results may be falsely increased if patient taking Biotin.      CBC Auto Differential [[id number]]  (Abnormal) Collected: 03/06/23 2351    Specimen: Blood from Arm, Left Updated: 03/07/23 0019     WBC 9.98 10*3/mm3      RBC 3.97 10*6/mm3      Hemoglobin 12.5 g/dL      Hematocrit 36.3 %      MCV 91.4 fL      MCH 31.5 pg      MCHC 34.4 g/dL      RDW 13.2 %      RDW-SD 44.3 fl  "     MPV 10.1 fL      Platelets 230 10*3/mm3      Neutrophil % 76.1 %      Lymphocyte % 10.6 %      Monocyte % 8.4 %      Eosinophil % 0.5 %      Basophil % 0.2 %      Immature Grans % 4.2 %      Neutrophils, Absolute 7.59 10*3/mm3      Lymphocytes, Absolute 1.06 10*3/mm3      Monocytes, Absolute 0.84 10*3/mm3      Eosinophils, Absolute 0.05 10*3/mm3      Basophils, Absolute 0.02 10*3/mm3      Immature Grans, Absolute 0.42 10*3/mm3      nRBC 0.3 /100 WBC     Urinalysis With Microscopic If Indicated (No Culture) - Straight Cath [[id number]]  (Abnormal) Collected: 03/06/23 2359    Specimen: Urine from Straight Cath Updated: 03/07/23 0107     Color, UA Dark Yellow     Appearance, UA Clear     pH, UA 6.0     Specific Gravity, UA 1.022     Glucose, UA Negative     Ketones, UA 15 mg/dL (1+)     Bilirubin, UA Negative     Blood, UA Negative     Protein, UA Trace     Leuk Esterase, UA Small (1+)     Nitrite, UA Negative     Urobilinogen, UA 1.0 E.U./dL    Urine Drug Screen - Straight Cath [[id number]]  (Normal) Collected: 03/06/23 2359    Specimen: Urine from Straight Cath Updated: 03/07/23 0044     Amphet/Methamphet, Screen Negative     Barbiturates Screen, Urine Negative     Benzodiazepine Screen, Urine Negative     Cocaine Screen, Urine Negative     Opiate Screen Negative     THC, Screen, Urine Negative     Methadone Screen, Urine Negative     Oxycodone Screen, Urine Negative    Narrative:      Negative Thresholds Per Drugs Screened:    Amphetamines                 500 ng/ml  Barbiturates                 200 ng/ml  Benzodiazepines              100 ng/ml  Cocaine                      300 ng/ml  Methadone                    300 ng/ml  Opiates                      300 ng/ml  Oxycodone                    100 ng/ml  THC                           50 ng/ml    The Normal Value for all drugs tested is negative. This report includes final unconfirmed screening results to be used for medical treatment purposes only. Unconfirmed  results must not be used for non-medical purposes such as employment or legal testing. Clinical consideration should be applied to any drug of abuse test, particularly when unconfirmed results are used.            Urinalysis, Microscopic Only - Straight Cath [[id number]]  (Abnormal) Collected: 03/06/23 2359    Specimen: Urine from Straight Cath Updated: 03/07/23 0107     RBC, UA None Seen /HPF      WBC, UA 6-12 /HPF      Bacteria, UA Trace /HPF      Squamous Epithelial Cells, UA 7-12 /HPF      Hyaline Casts, UA 13-20 /LPF      Methodology Manual Light Microscopy    Urine Culture - Urine, Straight Cath [[id number]]  (Normal) Collected: 03/06/23 2359    Specimen: Urine from Straight Cath Updated: 03/07/23 1006     Urine Culture Culture in progress    Blood Culture - Blood, Arm, Right [[id number]] Collected: 03/07/23 0239    Specimen: Blood from Arm, Right Updated: 03/07/23 0334    Blood Culture - Blood, Arm, Right [[id number]] Collected: 03/07/23 0258    Specimen: Blood from Arm, Right Updated: 03/07/23 0303    High Sensitivity Troponin T 2Hr [[id number]]  (Abnormal) Collected: 03/07/23 0258    Specimen: Blood from Arm, Right Updated: 03/07/23 0342     HS Troponin T 18 ng/L      Troponin T Delta -7 ng/L     Narrative:      High Sensitive Troponin T Reference Range:  <10.0 ng/L- Negative Female for AMI  <15.0 ng/L- Negative Male for AMI  >=10 - Abnormal Female indicating possible myocardial injury.  >=15 - Abnormal Male indicating possible myocardial injury.   Clinicians would have to utilize clinical acumen, EKG, Troponin, and serial changes to determine if it is an Acute Myocardial Infarction or myocardial injury due to an underlying chronic condition.         CBC (No Diff) [[id number]]  (Abnormal) Collected: 03/07/23 0629    Specimen: Blood from Arm, Left Updated: 03/07/23 0643     WBC 7.82 10*3/mm3      RBC 3.77 10*6/mm3      Hemoglobin 11.6 g/dL      Hematocrit 35.3 %      MCV 93.6 fL      MCH 30.8 pg      MCHC 32.9  g/dL      RDW 13.5 %      RDW-SD 46.5 fl      MPV 9.2 fL      Platelets 246 10*3/mm3     Comprehensive Metabolic Panel [[id number]]  (Abnormal) Collected: 03/07/23 0629    Specimen: Blood from Arm, Left Updated: 03/07/23 0710     Glucose 90 mg/dL      BUN 20 mg/dL      Creatinine 0.40 mg/dL      Sodium 133 mmol/L      Potassium 3.8 mmol/L      Chloride 91 mmol/L      CO2 30.0 mmol/L      Calcium 9.0 mg/dL      Total Protein 6.5 g/dL      Albumin 3.1 g/dL      ALT (SGPT) 8 U/L      AST (SGOT) 14 U/L      Alkaline Phosphatase 132 U/L      Total Bilirubin 0.2 mg/dL      Globulin 3.4 gm/dL      A/G Ratio 0.9 g/dL      BUN/Creatinine Ratio 50.0     Anion Gap 12.0 mmol/L      eGFR 97.1 mL/min/1.73     Narrative:      GFR Normal >60  Chronic Kidney Disease <60  Kidney Failure <15    The GFR formula is only valid for adults with stable renal function between ages 18 and 70.    Hemoglobin A1c [[id number]]  (Normal) Collected: 03/07/23 0629    Specimen: Blood from Arm, Left Updated: 03/07/23 0706     Hemoglobin A1C 5.00 %     Narrative:      Hemoglobin A1C Ranges:    Increased Risk for Diabetes  5.7% to 6.4%  Diabetes                     >= 6.5%  Diabetic Goal                < 7.0%    Lipid Panel [[id number]]  (Abnormal) Collected: 03/07/23 0629    Specimen: Blood from Arm, Left Updated: 03/07/23 0710     Total Cholesterol 160 mg/dL      Triglycerides 69 mg/dL      HDL Cholesterol 68 mg/dL      LDL Cholesterol  79 mg/dL      VLDL Cholesterol 13 mg/dL      LDL/HDL Ratio 1.15    Narrative:      Cholesterol Reference Ranges  (U.S. Department of Health and Human Services ATP III Classifications)    Desirable          <200 mg/dL  Borderline High    200-239 mg/dL  High Risk          >240 mg/dL      Triglyceride Reference Ranges  (U.S. Department of Health and Human Services ATP III Classifications)    Normal           <150 mg/dL  Borderline High  150-199 mg/dL  High             200-499 mg/dL  Very High        >500 mg/dL    HDL  Reference Ranges  (U.S. Department of Health and Human Services ATP III Classifications)    Low     <40 mg/dl (major risk factor for CHD)  High    >60 mg/dl ('negative' risk factor for CHD)        LDL Reference Ranges  (U.S. Department of Health and Human Services ATP III Classifications)    Optimal          <100 mg/dL  Near Optimal     100-129 mg/dL  Borderline High  130-159 mg/dL  High             160-189 mg/dL  Very High        >189 mg/dL    TSH [[id number]]  (Normal) Collected: 03/07/23 0629    Specimen: Blood from Arm, Left Updated: 03/07/23 0717     TSH 0.650 uIU/mL     Respiratory Panel PCR w/COVID-19(SARS-CoV-2) [facility]/MARY/XAVIER/PAD/COR/MAD/JABIER In-House, NP Swab in UTM/VTM, 3-4 HR TAT - Swab, Nasopharynx [[id number]]  (Normal) Collected: 03/07/23 0730    Specimen: Swab from Nasopharynx Updated: 03/07/23 0909     ADENOVIRUS, PCR Not Detected     Coronavirus 229E Not Detected     Coronavirus HKU1 Not Detected     Coronavirus NL63 Not Detected     Coronavirus OC43 Not Detected     COVID19 Not Detected     Human Metapneumovirus Not Detected     Human Rhinovirus/Enterovirus Not Detected     Influenza A PCR Not Detected     Influenza B PCR Not Detected     Parainfluenza Virus 1 Not Detected     Parainfluenza Virus 2 Not Detected     Parainfluenza Virus 3 Not Detected     Parainfluenza Virus 4 Not Detected     RSV, PCR Not Detected     Bordetella pertussis pcr Not Detected     Bordetella parapertussis PCR Not Detected     Chlamydophila pneumoniae PCR Not Detected     Mycoplasma pneumo by PCR Not Detected    Narrative:      In the setting of a positive respiratory panel with a viral infection PLUS a negative procalcitonin without other underlying concern for bacterial infection, consider observing off antibiotics or discontinuation of antibiotics and continue supportive care. If the respiratory panel is positive for atypical bacterial infection (Bordetella pertussis, Chlamydophila pneumoniae, or Mycoplasma pneumoniae),  consider antibiotic de-escalation to target atypical bacterial infection.          Imaging Results (Last 24 Hours)     Procedure Component Value Units Date/Time    MRI Brain Without Contrast [[id number]] Resulted: 03/07/23 1007     Updated: 03/07/23 1026    CT Head Without Contrast [[id number]] Collected: 03/07/23 0032     Updated: 03/07/23 0037    Narrative:      CT HEAD WITHOUT CONTRAST     HISTORY: Altered mental status     COMPARISON: None available.     TECHNIQUE: Axial CT imaging was obtained through the brain. No IV  contrast was administered.     FINDINGS:  No acute intracranial hemorrhage is seen. There is minimal atrophy.  There is some periventricular and deep white matter microangiopathic  change. There is no midline shift or mass effect. Old infarct is  suspected within the anterior limb of the left internal capsule. The  paranasal sinuses appear clear. There is mild partial opacification of  the mastoid air cells.       Impression:      No acute intracranial findings.     Radiation dose reduction techniques were utilized, including automated  exposure control and exposure modulation based on body size.     This report was finalized on 3/7/2023 12:34 AM by Dr. Luz Maria Barboza M.D.       XR Chest 1 View [[id number]] Collected: 03/07/23 0014     Updated: 03/07/23 0019    Narrative:      SINGLE VIEW OF THE CHEST     HISTORY: Shortness of air     COMPARISON: 03/24/2013     FINDINGS:  Cardiomegaly is present. Patient has areas of chronic scarring within  both lungs. There is calcification of the aorta. There is left basilar  consolidation. Pneumonia is not excluded. There is some scarring versus  atelectasis noted at the right lung base. Lung volumes are overall  diminished.       Impression:      Nonspecific left basilar consolidation. Pneumonia cannot be excluded.     This report was finalized on 3/7/2023 12:16 AM by Dr. Luz Maria Barboza M.D.             Results for orders placed during the hospital  encounter of 12/30/16    Adult Transthoracic Echo Complete    Interpretation Summary  · Left ventricular function is normal.  · Left ventricular function is normal. Calculated EF = 65.2%.  · There are myxomatous changes of the mitral valve apparatus present.  · There is mild prolapse of both mitral valve leaflets.  · Trace mitral valve regurgitation is present.  · Mild to moderate tricuspid valve regurgitation is present.  · The calculated right ventricular systolic pressure is 30 mmHg (normal  · There is no evidence of pericardial effusion.      ECG 12 Lead Altered Mental Status   Preliminary Result   HEART RATE= 73  bpm   RR Interval= 822  ms   VA Interval= 168  ms   P Horizontal Axis= -8  deg   P Front Axis= 59  deg   QRSD Interval= 143  ms   QT Interval= 396  ms   QRS Axis= -27  deg   T Wave Axis= -1  deg   - ABNORMAL ECG -   Sinus rhythm   Atrial premature complex   Left atrial enlargement   Right bundle branch block   Baseline wander in lead(s) V6   Electronically Signed By:    Date and Time of Study: 2023-03-07 00:15:26           Assessment/Plan     Active Hospital Problems    Diagnosis  POA   • **Altered mental status, unspecified altered mental status type [R41.82]  Yes   • UTI (urinary tract infection) due to urinary indwelling catheter (HCC) [T83.511A, N39.0]  Unknown   • CAP (community acquired pneumonia) [J18.9]  Unknown   • Oropharyngeal dysphagia [R13.12]  Unknown   • Iron deficiency anemia [D50.9]  Unknown   • Paroxysmal atrial fibrillation (HCC) [I48.0]  Yes   • Acquired hypothyroidism [E03.9]  Yes      Resolved Hospital Problems   No resolved problems to display.     Urinary tract infection  Pneumonia  -Chest x-ray suspicious for pneumonia, RVP negative  -Urine culture pending, blood culture pending  -Ceftriaxone  -Continue Mucinex    Metabolic encephalopathy/slurred speech, has now resolved  -Likely secondary to urinary tract infection  -Stroke has been consulted, patient recommendations  -A1c  5.0%, lipid panel within normal limits.  Atorvastatin started, aspirin continued  -UDS negative, ethanol negative  -CT head with no acute findings, MRI pending    History of cervical fracture causing dysphagia  -Patient is PEG tube dependent for nutrition, does take some pills orally  -Previously followed up with [facility]  -Speech/nutrition of been consulted    Hypothyroidism-continue home Synthroid    Paroxysmal atrial fibrillation  -Outpatient cardiology notes reviewed, seems like patient had very brief one-time episode of atrial fibrillation secondary to psych medication changes  -Continue home metoprolol, and aspirin not on anticoagulation as an outpatient    Depression/appetite stimulant- continue home trazodone and Remeron    Iron deficiency anemia-continue home iron supplementation    · I discussed the patient's findings and my recommendations with patient, family and nursing staff.    VTE Prophylaxis - SCDs.  Code Status - Full code.       Pino Mcmanus MD  [facility]  03/07/23  10:53 EST

## 2023-03-07 NOTE — THERAPY EVALUATION
Acute Care - Speech Language Pathology   Swallow Initial Evaluation [facility]     Patient Name: Kiya Cramer  : 1935  MRN: [id number]  Today's Date: 3/7/2023               Admit Date: 3/6/2023    Visit Dx:     ICD-10-CM ICD-9-CM   1. Altered mental status, unspecified altered mental status type  R41.82 780.97   2. Urinary tract infection without hematuria, site unspecified  N39.0 599.0   3. 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)   • Palpitations   • Vitamin D deficiency   • Paroxysmal atrial fibrillation (HCC)   • Altered mental status, unspecified altered mental status type   • UTI (urinary tract infection) due to urinary indwelling catheter (HCC)   • CAP (community acquired pneumonia)   • Oropharyngeal dysphagia   • Iron deficiency anemia     Past Medical History:   Diagnosis Date   • Acquired hypothyroidism 2015   • Health care maintenance    • Heart murmur    • Hypertension    • Mitral regurgitation     Trace to mild per 2-D echocardiogram 3/25/2013   • Mitral valve prolapse     Mild per 2-D echocardiogram 2016-trace mitral valve regurgitation   • Palpitations 2016    Description: had a prior holter with 35 sec of AF; stopped dilt and pradaxa in 3/13; added metoprolol; more palp and will get event recorder; event recorder was normal   • Paroxysmal atrial fibrillation (HCC)     History of 35 second run of paroxysmal atrial fibrillation per Holter; The patient previously was on diltiazem and this was discontinued due to adverse effects; she was on Pradaxa but has an unsteady gait and this was discontinued; she has remained on metoprolol tartrate   • Right bundle branch block     Chronic   • 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 for Method of Medication Administration: meds via alternate route (03/07/23 1400)     Monitor for Signs of Aspiration: yes, notify SLP if any concerns (03/07/23 1400)  Recommended Diagnostics: reassess via VFSS ([facility]) (03/07/23 1400)     Anticipated Discharge Disposition (SLP): anticipate therapy at next level of care (03/07/23 1400)     Therapy Frequency (Swallow): PRN (03/07/23 1400)  Predicted Duration Therapy Intervention (Days): until discharge (03/07/23 1400)                                               SWALLOW EVALUATION (last 72 hours)     SLP Adult Swallow Evaluation     Row Name 03/07/23 1400                   Rehab Evaluation    Document Type evaluation  -SH           General Information    Patient Profile Reviewed yes  -SH        Pertinent History Of Current Problem UTI, possible pna, transient dysarthria  -        Current Method of Nutrition NPO  -SH        Precautions/Limitations, Vision WFL;for purposes of eval  -SH        Precautions/Limitations, Hearing WFL;for purposes of eval  -SH        Prior Level of Function-Communication cognitive-linguistic impairment  -        Prior Level of Function-Swallowing soft to chew;thin liquids  -        Plans/Goals Discussed with patient;agreed upon;other (see comments)  caregiver  -        Barriers to Rehab medically complex  -SH           Pain    Additional Documentation Pain Scale: FACES Pre/Post-Treatment (Group)  -           Pain Scale: FACES Pre/Post-Treatment    Pain: FACES Scale, Pretreatment 4-->hurts little more  -SH            Oral Motor Structure and Function    Dentition Assessment natural, present and adequate  -           Oral Musculature and Cranial Nerve Assessment    Oral Motor General Assessment generalized oral motor weakness  -           Clinical Swallow Eval    Clinical Swallow Evaluation Summary Patient seen for clinical swallow assessment. Pt with hx of recurrent dysphagia following cervical fracture last Spring. She was hospitalized at [facility] and went to [facility] for rehab per caregiver. Pt was hospitalized in the fall and MBSS completed 8/18/22 revealed MOD dysphagia, but study 9/30/22 revealed severe dysphagia with silent aspiration of nectar and thins. The remaining dysphagia hx was provided from caregiver, Berkley. Pt did recover her swallow by Thanksgiving, but was hospitalized following that holiday and had to again recover her swallow function. She was NPO when discharged to [place], but progressed to PO at SNF. Pt is current with home health and has SLP services. Prior to this event, patient was eating a soft diet and using her PEG for some meds. Pt developed coughing with puree. Pt weak, but no focal weakness noted in oral mech exam. No overt s/s of aspiration with ice or thins via cup/straw. Throat clearing with thins via spoon. Cough, grimace, and multiple swallows with puree. SLP recs free water protocol. Use PEG for meds. Will follow with VFSS to further assess swallow function next date.  -           SLP Evaluation Clinical Impression    SLP Swallowing Diagnosis suspected pharyngeal dysphagia  -           Recommendations    Therapy Frequency (Swallow) PRN  -        Predicted Duration Therapy Intervention (Days) until discharge  -        SLP Diet Recommendation water between meals after oral care, with supervision;ice chips between meals after oral care, with supervision  -        Recommended Diagnostics reassess via VFSS ([facility])  -        Oral Care Recommendations Oral Care BID/PRN;Before ice/water   -        SLP Rec. for Method of Medication Administration meds via alternate route  -        Monitor for Signs of Aspiration yes;notify SLP if any concerns  -        Anticipated Discharge Disposition (SLP) anticipate therapy at next level of care  -           Swallow Goals (SLP)    Swallow LTGs Patient will demonstrate functional swallow for  -SH           (LTG) Patient will demonstrate functional swallow for    Diet Texture (Demonstrate functional swallow) soft to chew (whole) textures  -        Liquid viscosity (Demonstrate functional swallow) thin liquids  -        [place] (Demonstrate functional swallow) independently (over 90% accuracy)  -              User Key  (r) = Recorded By, (t) = Taken By, (c) = Cosigned By    Initials Name Effective Dates     Luz Prince, MS CCC-SLP 06/16/21 -                 EDUCATION  The patient has been educated in the following areas:   Dysphagia (Swallowing Impairment).        SLP GOALS     Row Name 03/07/23 1400             (LTG) Patient will demonstrate functional swallow for    Diet Texture (Demonstrate functional swallow) soft to chew (whole) textures  -      Liquid viscosity (Demonstrate functional swallow) thin liquids  -      [place] (Demonstrate functional swallow) independently (over 90% accuracy)  -            User Key  (r) = Recorded By, (t) = Taken By, (c) = Cosigned By    Initials Name Provider Type     Luz Prince MS HAL CCC-SLP Speech and Language Pathologist                   Time Calculation:    Time Calculation- SLP     Row Name 03/07/23 1455             Time Calculation- [facility]    SLP Start Time 1300  -      SLP Received On 03/07/23  -         Untimed Charges    85734-JB Eval Oral Pharyng Swallow Minutes 75  -         Total Minutes    Untimed Charges Total Minutes 75  -       Total Minutes 75  -            User Key  (r) = Recorded By, (t) = Taken By, (c) = Cosigned By    Initials Name Provider Type     Niki Luz HAL,  MS CCC-SLP Speech and Language Pathologist                Therapy Charges for Today     Code Description Service Date Service Provider Modifiers Qty    [id number]  ST EVAL ORAL PHARYNG SWALLOW 5 3/7/2023 Luz Prince, MS MONTERROSO-SLP GN 1               Luz Prince MS CCC-SLP  3/7/2023

## 2023-03-07 NOTE — DISCHARGE PLACEMENT REQUEST
"Kiya Cramer (87 y.o. Female)     Date of Birth   1935    Social Security Number       Address   [address] [place] [address]    Home Phone   [phone]    MRN   [id number]       Christianity   None    Marital Status                               Admission Date   3/6/23    Admission Type   Emergency    Admitting Provider   Pino Mcmanus MD    Attending Provider   Pino Mcmanus MD    Department, Room/Bed   [facility] Emergency Department, 35/35       Discharge Date       Discharge Disposition       Discharge Destination                               Attending Provider: Pino Mcmanus MD    Allergies: Cardizem [Diltiazem Hcl]    Isolation: None   Infection: None   Code Status: CPR    Ht: 172.7 cm (68\")   Wt: 49.4 kg (108 lb 14.2 oz)    Admission Cmt: None   Principal Problem: Altered mental status, unspecified altered mental status type [R41.82]                 Active Insurance as of 3/6/2023     Primary Coverage     Payor Plan Insurance Group Employer/Plan Group    MEDICARE MEDICARE A & B      Payor Plan Address Payor Plan Phone Number Payor Plan Fax Number Effective Dates    [address] [phone]  3/1/2002 - None Entered    [facility] [id number]       Subscriber Name Subscriber Birth Date Member ID       KIYA CRAMER 1935 [id number]           Secondary Coverage     Payor Plan Insurance Group Employer/Plan Group    AARP  SUP AARP HEALTH CARE OPTIONS      Payor Plan Address Payor Plan Phone Number Payor Plan Fax Number Effective Dates    [facility] [phone]  1/1/2016 - None Entered    [address]       [facility] [address]       Subscriber Name Subscriber Birth Date Member ID       KIYA CRAMER 1935 [id number]                 Emergency Contacts      (Rel.) Home Phone Work Phone Mobile Phone    Gabby Barreto (Daughter) [phone] -- --            "

## 2023-03-07 NOTE — CASE MANAGEMENT/SOCIAL WORK
Discharge Planning Assessment  [facility]     Patient Name: Kiya Cramer  MRN: [id number]  Today's Date: 3/7/2023    Admit Date: 3/6/2023    Plan: Rehab vs. return home with caregivers and [facility]   Discharge Needs Assessment     Row Name 03/07/23 1434       Living Environment    People in Home child(brittney), adult    Name(s) of People in Home has caregivers with [facility]    Current Living Arrangements home    Primary Care Provided by homecare agency    Provides Primary Care For no one, unable/limited ability to care for self    Family Caregiver if Needed child(brittney), adult    Family Caregiver Names Aileen Barreto [phone] is POA    Quality of Family Relationships helpful    Able to Return to Prior Arrangements other (see comments)  Unsure - will follow       Resource/Environmental Concerns    Transportation Concerns none       Food Insecurity    Within the past 12 months, you worried that your food would run out before you got the money to buy more. Never true    Within the past 12 months, the food you bought just didn't last and you didn't have money to get more. Never true       Transition Planning    Patient/Family Anticipates Transition to inpatient rehabilitation facility    Patient/Family Anticipated Services at Transition skilled nursing    Transportation Anticipated family or friend will provide       Discharge Needs Assessment    Readmission Within the Last 30 Days no previous admission in last 30 days    Current Outpatient/Agency/Support Group homecare agency    Equipment Currently Used at Home walker, rolling;wheelchair;grab bar;shower chair    Concerns to be Addressed discharge planning;basic needs    Anticipated Changes Related to Illness none    Equipment Needed After Discharge none    Provided Post Acute Provider List? N/A    N/A Provider List Comment Is current with [facility].  Daughter wants [facility]errals to [facility]. Juan [facility]               Discharge Plan      Row Name 03/07/23 1442       Plan    Patient/Family in Agreement with Plan yes    Plan Comments Spoke with patient and caregiver from [facility] at bedside.  Patient lives alone, has caregivers most of the day from [facility].  She uses a walker, W/C, has grab bars in the BR, shower chair.  She is current with [facility] -spoke with Jennifer and she will follow.  She has been to [facility], [facility] and [facility] in the past.  PCP is Nirmala PARADA ([phone]) and pharmacy is [facility].  Patient is agreeable for CCP to speak to her daughter (POHAL) Gabby Barreto [phone].  Spoke with daughter by telephone.  Patient picks up patient meds and puts them in a planner and [facility] helps with crushing meds and patient also has a PEG for feedings but does eat some.  Daughter is agreeable to referral to [facility] (1st choice)- spoke with [facility] - email to Dotty and to [facility] - email to Nancy, if patient needs rehab.  CCP will follow.  Doyle MONROE    Row Name 03/07/23 1440       Plan    Plan Rehab vs. return home with caregivers and [facility]              Continued Care and Services - Admitted Since 3/6/2023     Destination     Service Provider Request Status Selected Services Address Phone Fax Patient Preferred    [facility] Pending - Request Sent N/A [address] [facility] [phone] --    [facility] Pending - Request Sent N/A [address] [facility] [phone] --    [facility] - Request Sent N/A [address] [facility] [address] [phone] --          Home Medical Care     Service Provider Request Status Selected Services Address Phone Fax Patient Preferred    [facility] Pending - Request Sent N/A [address] [facility] [id number] [phone] --                  Demographic Summary     Row Name 03/07/23 1433       General Information    Admission Type observation    Arrived From home    Reason for Consult discharge planning    Preferred Language English               Functional Status     Row Name 03/07/23 1433       Functional Status    Usual Activity Tolerance fair    Current Activity Tolerance fair       Functional Status, IADL    Medications completely dependent    Meal Preparation completely dependent    Housekeeping completely dependent    Laundry completely dependent    Shopping completely dependent       Mental Status    General Appearance WDL WDL       Mental Status Summary    Recent Changes in Mental Status/Cognitive Functioning ability to speak clearly                         Becky S. [place], RN

## 2023-03-07 NOTE — PLAN OF CARE
Problem: Aspiration (Enteral Nutrition)  Goal: Absence of Aspiration Signs and Symptoms  Outcome: Ongoing, Progressing     Problem: Device-Related Complication Risk (Enteral Nutrition)  Goal: Safe, Effective Therapy Delivery  Outcome: Ongoing, Progressing     Problem: Feeding Intolerance (Enteral Nutrition)  Goal: Feeding Tolerance  Outcome: Ongoing, Progressing  Intervention: Prevent and Manage Feeding Intolerance  Flowsheets (Taken 3/7/2023 0340)  Nutrition Support Management: weight trending reviewed   Goal Outcome Evaluation:   Received consult for TF assessment. Pt was on TF via PEG at home per care giver. Was receiving Jevity 1.5. RD ordered Isosource 1.5 (Jevity 1.5 equivalent) @ 15 mL/hr. Plan to advance 10 mL q 12 hr to goal 45 mL/hr. Flush with 35 mL FW q 4 hr while pt receiving IVF's. Currently NPO per SLP with plan for VFSS at later date. RD will continue to follow course for EN delivery and tolerance.

## 2023-03-07 NOTE — PROGRESS NOTES
[facility] Acute Inpt Rehab Note     Referral received via stroke order set.  Please note this is a screening only, rehab admissions will not actively be evaluating this patient.  If felt patient is appropriate for our services once therapies begin, please call our office at [phone], to initiate a full referral.    Thank you,   America Cramer RN   Rehab Admission Nurse

## 2023-03-07 NOTE — CONSULTS
Nutrition Services    Patient Name:  Kiya Cramer  YOB: 1935  MRN: [id number]  Admit Date:  3/6/2023  Assessment Date:  03/07/23    CLINICAL NUTRITION ASSESSMENT    Comments: Consult for tube feeding assessment    Spoke with pt's caregiver at bedside who reports pt was receiving Jevity 1.5 via PEG with 235 mL bolus 4-5 x daily. She reported pt had difficulty tolerating 8 oz all at one time so was encouraged to decrease volume of bolus and increase PO intake. Swallow eval completed today with pt to be NPO 2/2 aspiration on pureed foods. Plan for VFSS at later date per SLP notes. TF recs below.    Recommendations:   1. When medically appropriate initiate Isosource 1.5 @ 15 mL/hr. Advance 10 mL q 12 hr to goal 45 mL/hr x 22 hr/day.   -- Hold TF one hour before and one hour after administering levothyroxine.   -- Provides 1485 kcal, 67 gm pro and 752 mL H2O daily.     2. Flush with 35 mL FW q 4 hr while pt receiving IVF's.     3. Pt at risk for refeeding syndrome 2/2 uncertainty of nutrition intake PTA. Would monitor labs (Mg, Phos, K+) at least q 12 hrs and replace PRN while TF advancing to goal.     RD to follow tolerance, labs and clinical course.     Encounter Information         Reason for Encounter Consult for TF assessment       Admitting Diagnosis Altered mental status       Pertinent Medical History HTN, PAF, recent admission to [place] for encephalopathy related to UTI, dysphagia requiring PEG tube for nutrition      Current Issues 87yoM with PEG related to dysphagia presents to ED with c/o slurred speech and decreased activity over past week. CT head and MRI brain both negative for stroke per neurology. Pt with suspected UTI, urine culture pending. XR chest suspicious for pneumonia but RVP negative. Swallow eval completed today with recs for pt to receive nutrition via PEG and free water protocol.      Current Nutrition Orders & Evaluation of Intake       Oral Nutrition     Food Allergies  "NFKA   Current PO Diet NPO Diet NPO Type: Strict NPO   Supplement    PO Evaluation      % PO Intake    --  Anthropometrics          Height    Weight Height: 172.7 cm (68\")  Weight: 49.4 kg (108 lb 14.2 oz) (03/06/23 2324)    BMI kg/m2 Body mass index is 16.56 kg/m².    Weight History  Wt Readings from Last 15 Encounters:   03/06/23 2324 49.4 kg (108 lb 14.2 oz)   12/19/18 1426 56.7 kg (125 lb)   12/18/17 1302 52 kg (114 lb 9.6 oz)   12/30/16 1308 49 kg (108 lb)   12/14/16 1411 49 kg (108 lb)   12/15/15 1220 49.7 kg (109 lb 9.5 oz)   04/16/15 1022 49 kg (108 lb 0.1 oz)   10/27/14 1103 49.9 kg (110 lb 0.2 oz)   05/13/13 1032 48.5 kg (107 lb 0.2 oz)        Estimated/Assessed Needs        Energy Requirements    Height for Calculation  Height: 172.7 cm (68\")   Weight for Calculation Admit weight (49.4 kg)    Method for Estimation  30 kcal/kg   EST Needs (kcal/day) 1482 kcal        Protein Requirements    Weight for Calculation 49.4 kg    EST Protein Needs (g/kg) 1.2 - 1.5 gm/kg   EST Daily Needs (g/day) 59-74 gm        Fluid Requirements     Method for Estimation 1 mL/kcal    Estimated Needs (mL/day)          Physical Findings          Physical Appearance alert, flat affect, oriented, room air, underweight   Oral/Mouth Cavity WNL   Edema  no edema   Gastrointestinal normoactive   Skin  skin intact   Tubes/Drains PEG   NFPE Pending, due to: pt transferring to floor     Labs        Pertinent Labs Reviewed, listed below     Results from last 7 days   Lab Units 03/07/23  0629 03/06/23  2351   SODIUM mmol/L 133* 129*   POTASSIUM mmol/L 3.8 3.7   CHLORIDE mmol/L 91* 89*   CO2 mmol/L 30.0* 29.0   BUN mg/dL 20 26*   CREATININE mg/dL 0.40* 0.64   CALCIUM mg/dL 9.0 9.9   BILIRUBIN mg/dL 0.2 0.3   ALK PHOS U/L 132* 149*   ALT (SGPT) U/L 8 14   AST (SGOT) U/L 14 15   GLUCOSE mg/dL 90 100*     Results from last 7 days   Lab Units 03/07/23  0629 03/06/23  2351   MAGNESIUM mg/dL  --  2.1   HEMOGLOBIN g/dL 11.6* 12.5   HEMATOCRIT % 35.3 " 36.3   WBC 10*3/mm3 7.82 9.98   TRIGLYCERIDES mg/dL 69  --    ALBUMIN g/dL 3.1* 3.6     Results from last 7 days   Lab Units 03/07/23  0629 03/06/23  2351   INR   --  0.98   APTT seconds  --  33.2   PLATELETS 10*3/mm3 246 230     COVID19   Date Value Ref Range Status   03/07/2023 Not Detected Not Detected - Ref. Range Final     Lab Results   Component Value Date    HGBA1C 5.00 03/07/2023          Medications            Scheduled Medications [START ON 3/8/2023] aspirin, 81 mg, Oral, Daily  atorvastatin, 80 mg, Oral, Nightly  cefTRIAXone, 1 g, Intravenous, Q24H  ferrous sulfate, 300 mg, Oral, Q PM  guaifenesin, 400 mg, Oral, Q6H  lansoprazole, 15 mg, Oral, Q AM  [START ON 3/8/2023] levothyroxine, 75 mcg, Oral, Daily  metoprolol tartrate, 50 mg, Oral, BID  mirtazapine, 15 mg, Oral, Nightly  saccharomyces boulardii, 250 mg, Oral, BID  senna, 1 tablet, Oral, Nightly  traZODone, 50 mg, Oral, Nightly        Infusions sodium chloride, 75 mL/hr, Last Rate: 75 mL/hr (03/07/23 1313)        PRN Medications •  acetaminophen **OR** acetaminophen  •  bisacodyl  •  ondansetron  •  [COMPLETED] Insert Peripheral IV **AND** sodium chloride     PES STATEMENT / NUTRITION DIAGNOSIS      Nutrition Dx Problem  Problem: Needs Alternative Route  Etiology: Functional Diagnosis and Factors Affecting Nutrition - dysphagia  Signs/Symptoms: NPO, PO Diet Not Tolerated, Report/Observation and SLP/Swallow Evaluation    Comment:      PLAN OF CARE  Intervention Goal        Intervention Goal(s) Nutrition support treatment, Meet estimated needs, Disease management/therapy, Initiate TF/PN, Tolerate TF/PN at goal and No significant weight loss     Nutrition Intervention         RD Action Await initiation of EN/PN, Follow Tx Progress, Care plan reviewed and Recommend/ordered:      Nutrition Prescription          Recommendation       Enteral Prescription:     Enteral Route PEG    TF Delivery Method Continuous    Enteral Product Isosource 1.5    Modular      Propofol Rate/Kcal     TF Start Rate (mL/hr) 15 mL/hr     TF Goal Rate (mL/hr) 45 mL/hr    Free Water Flush (mL) 35 mL FW q 4 hr     TF Provision at Goal: 1485 kcal, 67 gm protein, 752 mL free water + 210 mL water flushes         Calories 100 % needs met         Protein  100 % needs met         Fluid (mL) 962 mL total     Prescription Ordered Yes     Monitor/Evaluation        Monitor Per protocol     RD to follow up per protocol.    Electronically signed by:  Norma Wall RD  03/07/23 14:34 EST

## 2023-03-07 NOTE — ED NOTES
Nursing report ED to floor  Kiya Cramer  87 y.o.  female    HPI :   Chief Complaint   Patient presents with    Speech Problem       Admitting doctor:   Pino Mcmanus MD    Admitting diagnosis:   The primary encounter diagnosis was Altered mental status, unspecified altered mental status type. Diagnoses of Urinary tract infection without hematuria, site unspecified and Slurred speech were also pertinent to this visit.    Code status:   Current Code Status       Date Active Code Status Order ID Comments User Context       3/7/2023 0335 CPR (Attempt to Resuscitate) [id number]  Lay Contreras APRN ED        Question Answer    Code Status (Patient has no pulse and is not breathing) CPR (Attempt to Resuscitate)    Medical Interventions (Patient has pulse or is breathing) Full Support    Release to patient Routine Release                    Allergies:   Cardizem [diltiazem hcl]    Isolation:   No active isolations    Intake and Output    Intake/Output Summary (Last 24 hours) at 3/7/2023 1314  Last data filed at 3/7/2023 0324  Gross per 24 hour   Intake 100 ml   Output --   Net 100 ml       Weight:       03/06/23 2324   Weight: 49.4 kg (108 lb 14.2 oz)       Most recent vitals:   Vitals:    03/07/23 1131 03/07/23 1201 03/07/23 1231 03/07/23 1301   BP: 128/71 150/61 146/98 149/80   BP Location: Right arm  Right arm Right arm   Patient Position: Lying  Lying Lying   Pulse: 70 73 76 67   Resp: 16  16 16   Temp:       TempSrc:       SpO2: 96% 95% 98% 95%   Weight:       Height:           Active LDAs/IV Access:   Lines, Drains & Airways       Active LDAs       Name Placement date Placement time Site Days    Peripheral IV 03/06/23 2323 Left Antecubital 03/06/23 2323  Antecubital  less than 1    External Urinary Catheter 03/07/23  0828  --  less than 1                    Labs (abnormal labs have a star):   Labs Reviewed   COMPREHENSIVE METABOLIC PANEL - Abnormal; Notable for the following components:       Result Value     Glucose 100 (*)     BUN 26 (*)     Sodium 129 (*)     Chloride 89 (*)     Alkaline Phosphatase 149 (*)     BUN/Creatinine Ratio 40.6 (*)     All other components within normal limits    Narrative:     GFR Normal >60  Chronic Kidney Disease <60  Kidney Failure <15    The GFR formula is only valid for adults with stable renal function between ages 18 and 70.   URINALYSIS W/ MICROSCOPIC IF INDICATED (NO CULTURE) - Abnormal; Notable for the following components:    Color, UA Dark Yellow (*)     Ketones, UA 15 mg/dL (1+) (*)     Protein, UA Trace (*)     Leuk Esterase, UA Small (1+) (*)     All other components within normal limits   TROPONIN - Abnormal; Notable for the following components:    HS Troponin T 25 (*)     All other components within normal limits    Narrative:     High Sensitive Troponin T Reference Range:  <10.0 ng/L- Negative Female for AMI  <15.0 ng/L- Negative Male for AMI  >=10 - Abnormal Female indicating possible myocardial injury.  >=15 - Abnormal Male indicating possible myocardial injury.   Clinicians would have to utilize clinical acumen, EKG, Troponin, and serial changes to determine if it is an Acute Myocardial Infarction or myocardial injury due to an underlying chronic condition.        T4, FREE - Abnormal; Notable for the following components:    Free T4 2.28 (*)     All other components within normal limits    Narrative:     Results may be falsely increased if patient taking Biotin.     T3, FREE - Abnormal; Notable for the following components:    T3, Free 1.61 (*)     All other components within normal limits    Narrative:     Results may be falsely increased if patient taking Biotin.     CBC WITH AUTO DIFFERENTIAL - Abnormal; Notable for the following components:    Neutrophil % 76.1 (*)     Lymphocyte % 10.6 (*)     Immature Grans % 4.2 (*)     Neutrophils, Absolute 7.59 (*)     Immature Grans, Absolute 0.42 (*)     nRBC 0.3 (*)     All other components within normal limits    URINALYSIS, MICROSCOPIC ONLY - Abnormal; Notable for the following components:    WBC, UA 6-12 (*)     Bacteria, UA Trace (*)     Squamous Epithelial Cells, UA 7-12 (*)     All other components within normal limits   HIGH SENSITIVITIY TROPONIN T 2HR - Abnormal; Notable for the following components:    HS Troponin T 18 (*)     Troponin T Delta -7 (*)     All other components within normal limits    Narrative:     High Sensitive Troponin T Reference Range:  <10.0 ng/L- Negative Female for AMI  <15.0 ng/L- Negative Male for AMI  >=10 - Abnormal Female indicating possible myocardial injury.  >=15 - Abnormal Male indicating possible myocardial injury.   Clinicians would have to utilize clinical acumen, EKG, Troponin, and serial changes to determine if it is an Acute Myocardial Infarction or myocardial injury due to an underlying chronic condition.        CBC (NO DIFF) - Abnormal; Notable for the following components:    Hemoglobin 11.6 (*)     All other components within normal limits   COMPREHENSIVE METABOLIC PANEL - Abnormal; Notable for the following components:    Creatinine 0.40 (*)     Sodium 133 (*)     Chloride 91 (*)     CO2 30.0 (*)     Albumin 3.1 (*)     Alkaline Phosphatase 132 (*)     BUN/Creatinine Ratio 50.0 (*)     All other components within normal limits    Narrative:     GFR Normal >60  Chronic Kidney Disease <60  Kidney Failure <15    The GFR formula is only valid for adults with stable renal function between ages 18 and 70.   LIPID PANEL - Abnormal; Notable for the following components:    HDL Cholesterol 68 (*)     All other components within normal limits    Narrative:     Cholesterol Reference Ranges  (U.S. Department of Health and Human Services ATP III Classifications)    Desirable          <200 mg/dL  Borderline High    200-239 mg/dL  High Risk          >240 mg/dL      Triglyceride Reference Ranges  (U.S. Department of Health and Human Services ATP III Classifications)    Normal            <150 mg/dL  Borderline High  150-199 mg/dL  High             200-499 mg/dL  Very High        >500 mg/dL    HDL Reference Ranges  (U.S. Department of Health and Human Services ATP III Classifications)    Low     <40 mg/dl (major risk factor for CHD)  High    >60 mg/dl ('negative' risk factor for CHD)        LDL Reference Ranges  (U.S. Department of Health and Human Services ATP III Classifications)    Optimal          <100 mg/dL  Near Optimal     100-129 mg/dL  Borderline High  130-159 mg/dL  High             160-189 mg/dL  Very High        >189 mg/dL   URINE CULTURE - Normal   RESPIRATORY PANEL PCR W/ COVID-19 (SARS-COV-2) [facility] IN-HOUSE, NP SWAB IN UT/VTP, 3-4 HR TAT - Normal    Narrative:     In the setting of a positive respiratory panel with a viral infection PLUS a negative procalcitonin without other underlying concern for bacterial infection, consider observing off antibiotics or discontinuation of antibiotics and continue supportive care. If the respiratory panel is positive for atypical bacterial infection (Bordetella pertussis, Chlamydophila pneumoniae, or Mycoplasma pneumoniae), consider antibiotic de-escalation to target atypical bacterial infection.   PROTIME-INR - Normal   APTT - Normal   BNP (IN-HOUSE) - Normal    Narrative:     Among patients with dyspnea, NT-proBNP is highly sensitive for the detection of acute congestive heart failure. In addition NT-proBNP of <300 pg/ml effectively rules out acute congestive heart failure with 99% negative predictive value.    Results may be falsely decreased if patient taking Biotin.     LACTIC ACID, PLASMA - Normal   PROCALCITONIN - Normal    Narrative:     As a Marker for Sepsis (Non-Neonates):    1. <0.5 ng/mL represents a low risk of severe sepsis and/or septic shock.  2. >2 ng/mL represents a high risk of severe sepsis and/or septic shock.    As a Marker for Lower Respiratory Tract Infections that require antibiotic therapy:    PCT on  "Admission    Antibiotic Therapy       6-12 Hrs later    >0.5                Strongly Recommended  >0.25 - <0.5        Recommended   0.1 - 0.25          Discouraged              Remeasure/reassess PCT  <0.1                Strongly Discouraged     Remeasure/reassess PCT    As 28 day mortality risk marker: \"Change in Procalcitonin Result\" (>80% or <=80%) if Day 0 (or Day 1) and Day 4 values are available. Refer to [email or web address]-pct-[email or web address]    Change in PCT <=80%  A decrease of PCT levels below or equal to 80% defines a positive change in PCT test result representing a higher risk for 28-day all-cause mortality of patients diagnosed with severe sepsis for septic shock.    Change in PCT >80%  A decrease of PCT levels of more than 80% defines a negative change in PCT result representing a lower risk for 28-day all-cause mortality of patients diagnosed with severe sepsis or septic shock.      URINE DRUG SCREEN - Normal    Narrative:     Negative Thresholds Per Drugs Screened:    Amphetamines                 500 ng/ml  Barbiturates                 200 ng/ml  Benzodiazepines              100 ng/ml  Cocaine                      300 ng/ml  Methadone                    300 ng/ml  Opiates                      300 ng/ml  Oxycodone                    100 ng/ml  THC                           50 ng/ml    The Normal Value for all drugs tested is negative. This report includes final unconfirmed screening results to be used for medical treatment purposes only. Unconfirmed results must not be used for non-medical purposes such as employment or legal testing. Clinical consideration should be applied to any drug of abuse test, particularly when unconfirmed results are used.           MAGNESIUM - Normal   TSH - Normal   HEMOGLOBIN A1C - Normal    Narrative:     Hemoglobin A1C Ranges:    Increased Risk for Diabetes  5.7% to 6.4%  Diabetes                     >= 6.5%  Diabetic Goal                < 7.0%   TSH - Normal   POCT " GLUCOSE FINGERSTICK - Normal   POCT GLUCOSE FINGERSTICK - Normal   BLOOD CULTURE   BLOOD CULTURE   ETHANOL   POCT GLUCOSE FINGERSTICK   POCT GLUCOSE FINGERSTICK   POCT GLUCOSE FINGERSTICK   POCT GLUCOSE FINGERSTICK   POCT GLUCOSE FINGERSTICK   POCT GLUCOSE FINGERSTICK   POCT GLUCOSE FINGERSTICK   CBC AND DIFFERENTIAL    Narrative:     The following orders were created for panel order CBC & Differential.  Procedure                               Abnormality         Status                     ---------                               -----------         ------                     CBC Auto Differential[[id number]]        Abnormal            Final result                 Please view results for these tests on the individual orders.       EKG:   ECG 12 Lead Altered Mental Status   Preliminary Result   HEART RATE= 73  bpm   RR Interval= 822  ms   MS Interval= 168  ms   P Horizontal Axis= -8  deg   P Front Axis= 59  deg   QRSD Interval= 143  ms   QT Interval= 396  ms   QRS Axis= -27  deg   T Wave Axis= -1  deg   - ABNORMAL ECG -   Sinus rhythm   Atrial premature complex   Left atrial enlargement   Right bundle branch block   Baseline wander in lead(s) V6   Electronically Signed By:    Date and Time of Study: 2023-03-07 00:15:26          Meds given in ED:   Medications   sodium chloride 0.9 % flush 10 mL (has no administration in time range)   sodium chloride 0.9 % infusion (75 mL/hr Intravenous Currently Infusing 3/7/23 1313)   acetaminophen (TYLENOL) tablet 650 mg (has no administration in time range)     Or   acetaminophen (TYLENOL) suppository 650 mg (has no administration in time range)   atorvastatin (LIPITOR) tablet 80 mg (has no administration in time range)   ondansetron (ZOFRAN) injection 4 mg (has no administration in time range)   bisacodyl (DULCOLAX) suppository 10 mg (has no administration in time range)   guaifenesin (ROBITUSSIN) 100 MG/5ML liquid 400 mg (400 mg Oral Given 3/7/23 1037)   metoprolol tartrate  (LOPRESSOR) tablet 50 mg (50 mg Oral Given 3/7/23 1225)   traZODone (DESYREL) tablet 50 mg (has no administration in time range)   aspirin EC tablet 81 mg (has no administration in time range)   saccharomyces boulardii (FLORASTOR) capsule 250 mg (250 mg Oral Given 3/7/23 1226)   mirtazapine (REMERON) tablet 15 mg (has no administration in time range)   senna (SENOKOT) tablet 1 tablet (has no administration in time range)   levothyroxine (SYNTHROID, LEVOTHROID) tablet 75 mcg (has no administration in time range)   cefTRIAXone (ROCEPHIN) 1 g in sodium chloride 0.9 % 100 mL IVPB-VTB (has no administration in time range)   lansoprazole (PREVACID SOLUTAB) disintegrating tablet Tablet Delayed Release Dispersible 15 mg (15 mg Oral Given 3/7/23 1220)   ferrous sulfate 300 (60 Fe) MG/5ML syrup 300 mg (has no administration in time range)   cefTRIAXone (ROCEPHIN) 1 g in sodium chloride 0.9 % 100 mL IVPB-VTB (0 g Intravenous Stopped 3/7/23 0324)       Imaging results:  CT Head Without Contrast    Result Date: 3/7/2023  No acute intracranial findings.  Radiation dose reduction techniques were utilized, including automated exposure control and exposure modulation based on body size.  This report was finalized on 3/7/2023 12:34 AM by Dr. Luz Maria Barboza M.D.      MRI Brain Without Contrast    Result Date: 3/7/2023  Mild to moderate small vessel ischemic disease is noted. There is no evidence of acute infarction. A trace amount fluid is present within the mastoid air cells bilaterally.      XR Chest 1 View    Result Date: 3/7/2023  Nonspecific left basilar consolidation. Pneumonia cannot be excluded.  This report was finalized on 3/7/2023 12:16 AM by Dr. Luz Maria Barboza M.D.       Ambulatory status:   - Ax2    Social issues:   Social History     Socioeconomic History    Marital status:    Tobacco Use    Smoking status: Never    Smokeless tobacco: Never    Tobacco comments:     caffeine use    Vaping Use    Vaping Use:  Former   Substance and Sexual Activity    Alcohol use: No    Drug use: No    Sexual activity: Defer       NIH Stroke Scale:  Interval: baseline      Rylee Hill RN  03/07/23 13:14 EST

## 2023-03-07 NOTE — PLAN OF CARE
Goal Outcome Evaluation:         Patient seen for clinical swallow assessment. Pt with hx of recurrent dysphagia following cervical fracture last Spring. She was hospitalized at [facility] and went to [facility] for rehab per caregiver. Pt was hospitalized in the fall and MBSS completed 8/18/22 revealed MOD dysphagia, but study 9/30/22 revealed severe dysphagia with silent aspiration of nectar and thins. The remaining dysphagia hx was provided from caregiver, Berkley. Pt did recover her swallow by Thanksgiving, but was hospitalized following that holiday and had to again recover her swallow function. She was NPO when discharged to [place], but progressed to PO at SNF. Pt is current with home health and has SLP services. Prior to this event, patient was eating a soft diet and using her PEG for some meds. Pt developed coughing with puree. Pt weak, but no focal weakness noted in oral mech exam. No overt s/s of aspiration with ice or thins via cup/straw. Throat clearing with thins via spoon. Cough, grimace, and multiple swallows with puree. SLP recs free water protocol. Use PEG for meds. Will follow with VFSS to further assess swallow function next date.       Patient was not wearing a face mask during this therapy encounter. Therapist used appropriate personal protective equipment including mask, eye protection and gloves.  Mask used was standard procedure mask. Appropriate PPE was worn during the entire therapy session. Hand hygiene was completed before and after therapy session. Patient is not in enhanced droplet precautions.

## 2023-03-08 ENCOUNTER — APPOINTMENT (OUTPATIENT)
Dept: GENERAL RADIOLOGY | Facility: HOSPITAL | Age: 88
DRG: 177 | End: 2023-03-08
Payer: MEDICARE

## 2023-03-08 ENCOUNTER — APPOINTMENT (OUTPATIENT)
Dept: CARDIOLOGY | Facility: HOSPITAL | Age: 88
DRG: 177 | End: 2023-03-08
Payer: MEDICARE

## 2023-03-08 LAB
ANION GAP SERPL CALCULATED.3IONS-SCNC: 8 MMOL/L (ref 5–15)
AORTIC ARCH: 2.2 CM
ASCENDING AORTA: 3 CM
BACTERIA SPEC AEROBE CULT: NO GROWTH
BASOPHILS # BLD AUTO: 0.01 10*3/MM3 (ref 0–0.2)
BASOPHILS NFR BLD AUTO: 0.2 % (ref 0–1.5)
BH CV ECHO MEAS - ACS: 1.63 CM
BH CV ECHO MEAS - AO MAX PG: 4.5 MMHG
BH CV ECHO MEAS - AO MEAN PG: 2.43 MMHG
BH CV ECHO MEAS - AO ROOT DIAM: 3 CM
BH CV ECHO MEAS - AO V2 MAX: 106 CM/SEC
BH CV ECHO MEAS - AO V2 VTI: 28.6 CM
BH CV ECHO MEAS - AVA(I,D): 2.35 CM2
BH CV ECHO MEAS - EDV(CUBED): 33.7 ML
BH CV ECHO MEAS - EDV(MOD-SP2): 44 ML
BH CV ECHO MEAS - EDV(MOD-SP4): 46 ML
BH CV ECHO MEAS - EF(MOD-BP): 62.5 %
BH CV ECHO MEAS - EF(MOD-SP2): 63.6 %
BH CV ECHO MEAS - EF(MOD-SP4): 60.9 %
BH CV ECHO MEAS - ESV(CUBED): 9.1 ML
BH CV ECHO MEAS - ESV(MOD-SP2): 16 ML
BH CV ECHO MEAS - ESV(MOD-SP4): 18 ML
BH CV ECHO MEAS - FS: 35.3 %
BH CV ECHO MEAS - IVS/LVPW: 0.98 CM
BH CV ECHO MEAS - IVSD: 0.75 CM
BH CV ECHO MEAS - LAT PEAK E' VEL: 5.9 CM/SEC
BH CV ECHO MEAS - LV DIASTOLIC VOL/BSA (35-75): 29.2 CM2
BH CV ECHO MEAS - LV MASS(C)D: 61.4 GRAMS
BH CV ECHO MEAS - LV MAX PG: 2.18 MMHG
BH CV ECHO MEAS - LV MEAN PG: 1.32 MMHG
BH CV ECHO MEAS - LV SYSTOLIC VOL/BSA (12-30): 11.4 CM2
BH CV ECHO MEAS - LV V1 MAX: 73.7 CM/SEC
BH CV ECHO MEAS - LV V1 VTI: 22.3 CM
BH CV ECHO MEAS - LVIDD: 3.2 CM
BH CV ECHO MEAS - LVIDS: 2.09 CM
BH CV ECHO MEAS - LVOT AREA: 3 CM2
BH CV ECHO MEAS - LVOT DIAM: 1.96 CM
BH CV ECHO MEAS - LVPWD: 0.76 CM
BH CV ECHO MEAS - MED PEAK E' VEL: 5.1 CM/SEC
BH CV ECHO MEAS - MV A DUR: 0.15 SEC
BH CV ECHO MEAS - MV A MAX VEL: 134.6 CM/SEC
BH CV ECHO MEAS - MV DEC SLOPE: 362.6 CM/SEC2
BH CV ECHO MEAS - MV DEC TIME: 0.11 MSEC
BH CV ECHO MEAS - MV E MAX VEL: 96.7 CM/SEC
BH CV ECHO MEAS - MV E/A: 0.72
BH CV ECHO MEAS - MV MAX PG: 6.4 MMHG
BH CV ECHO MEAS - MV MEAN PG: 2.28 MMHG
BH CV ECHO MEAS - MV P1/2T: 65.3 MSEC
BH CV ECHO MEAS - MV V2 VTI: 24.7 CM
BH CV ECHO MEAS - MVA(P1/2T): 3.4 CM2
BH CV ECHO MEAS - MVA(VTI): 2.7 CM2
BH CV ECHO MEAS - PA ACC TIME: 0.13 SEC
BH CV ECHO MEAS - PA PR(ACCEL): 19.6 MMHG
BH CV ECHO MEAS - PA V2 MAX: 74.7 CM/SEC
BH CV ECHO MEAS - PULM A REVS DUR: 0.12 SEC
BH CV ECHO MEAS - PULM A REVS VEL: 26.9 CM/SEC
BH CV ECHO MEAS - PULM DIAS VEL: 32 CM/SEC
BH CV ECHO MEAS - PULM S/D: 1.63
BH CV ECHO MEAS - PULM SYS VEL: 52 CM/SEC
BH CV ECHO MEAS - QP/QS: 0.63
BH CV ECHO MEAS - RAP SYSTOLE: 3 MMHG
BH CV ECHO MEAS - RV MAX PG: 0.94 MMHG
BH CV ECHO MEAS - RV V1 MAX: 48.4 CM/SEC
BH CV ECHO MEAS - RV V1 VTI: 11 CM
BH CV ECHO MEAS - RVOT DIAM: 2.21 CM
BH CV ECHO MEAS - RVSP: 39 MMHG
BH CV ECHO MEAS - SI(MOD-SP2): 17.8 ML/M2
BH CV ECHO MEAS - SI(MOD-SP4): 17.8 ML/M2
BH CV ECHO MEAS - SV(LVOT): 67.2 ML
BH CV ECHO MEAS - SV(MOD-SP2): 28 ML
BH CV ECHO MEAS - SV(MOD-SP4): 28 ML
BH CV ECHO MEAS - SV(RVOT): 42.1 ML
BH CV ECHO MEAS - TAPSE (>1.6): 2.23 CM
BH CV ECHO MEAS - TR MAX PG: 36 MMHG
BH CV ECHO MEAS - TR MAX VEL: 300 CM/SEC
BH CV ECHO MEASUREMENTS AVERAGE E/E' RATIO: 17.58
BH CV ECHO SHUNT ASSESSMENT PERFORMED (HIDDEN SCRIPTING): 1
BH CV XLRA - RV BASE: 4.8 CM
BH CV XLRA - RV LENGTH: 6.5 CM
BH CV XLRA - RV MID: 3.7 CM
BH CV XLRA - TDI S': 10.9 CM/SEC
BUN SERPL-MCNC: 12 MG/DL (ref 8–23)
BUN/CREAT SERPL: 37.5 (ref 7–25)
CALCIUM SPEC-SCNC: 8.5 MG/DL (ref 8.6–10.5)
CHLORIDE SERPL-SCNC: 97 MMOL/L (ref 98–107)
CO2 SERPL-SCNC: 29 MMOL/L (ref 22–29)
CREAT SERPL-MCNC: 0.32 MG/DL (ref 0.57–1)
DEPRECATED RDW RBC AUTO: 44.6 FL (ref 37–54)
EGFRCR SERPLBLD CKD-EPI 2021: 102.5 ML/MIN/1.73
EOSINOPHIL # BLD AUTO: 0.18 10*3/MM3 (ref 0–0.4)
EOSINOPHIL NFR BLD AUTO: 3.4 % (ref 0.3–6.2)
ERYTHROCYTE [DISTWIDTH] IN BLOOD BY AUTOMATED COUNT: 13.1 % (ref 12.3–15.4)
GLUCOSE BLDC GLUCOMTR-MCNC: 105 MG/DL (ref 70–130)
GLUCOSE BLDC GLUCOMTR-MCNC: 105 MG/DL (ref 70–130)
GLUCOSE BLDC GLUCOMTR-MCNC: 112 MG/DL (ref 70–130)
GLUCOSE BLDC GLUCOMTR-MCNC: 95 MG/DL (ref 70–130)
GLUCOSE BLDC GLUCOMTR-MCNC: 99 MG/DL (ref 70–130)
GLUCOSE SERPL-MCNC: 89 MG/DL (ref 65–99)
HCT VFR BLD AUTO: 34.5 % (ref 34–46.6)
HGB BLD-MCNC: 11.5 G/DL (ref 12–15.9)
IMM GRANULOCYTES # BLD AUTO: 0.09 10*3/MM3 (ref 0–0.05)
IMM GRANULOCYTES NFR BLD AUTO: 1.7 % (ref 0–0.5)
LEFT ATRIUM VOLUME INDEX: 24.2 ML/M2
LYMPHOCYTES # BLD AUTO: 0.88 10*3/MM3 (ref 0.7–3.1)
LYMPHOCYTES NFR BLD AUTO: 16.6 % (ref 19.6–45.3)
MAXIMAL PREDICTED HEART RATE: 135 BPM
MCH RBC QN AUTO: 31 PG (ref 26.6–33)
MCHC RBC AUTO-ENTMCNC: 33.3 G/DL (ref 31.5–35.7)
MCV RBC AUTO: 93 FL (ref 79–97)
MONOCYTES # BLD AUTO: 0.53 10*3/MM3 (ref 0.1–0.9)
MONOCYTES NFR BLD AUTO: 10 % (ref 5–12)
NEUTROPHILS NFR BLD AUTO: 3.62 10*3/MM3 (ref 1.7–7)
NEUTROPHILS NFR BLD AUTO: 68.1 % (ref 42.7–76)
NRBC BLD AUTO-RTO: 0 /100 WBC (ref 0–0.2)
PLATELET # BLD AUTO: 198 10*3/MM3 (ref 140–450)
PMV BLD AUTO: 9.5 FL (ref 6–12)
POTASSIUM SERPL-SCNC: 3.2 MMOL/L (ref 3.5–5.2)
RBC # BLD AUTO: 3.71 10*6/MM3 (ref 3.77–5.28)
SINUS: 2.6 CM
SODIUM SERPL-SCNC: 134 MMOL/L (ref 136–145)
STJ: 2.5 CM
STRESS TARGET HR: 115 BPM
WBC NRBC COR # BLD: 5.31 10*3/MM3 (ref 3.4–10.8)

## 2023-03-08 PROCEDURE — 74230 X-RAY XM SWLNG FUNCJ C+: CPT

## 2023-03-08 PROCEDURE — 94799 UNLISTED PULMONARY SVC/PX: CPT

## 2023-03-08 PROCEDURE — 82962 GLUCOSE BLOOD TEST: CPT

## 2023-03-08 PROCEDURE — 97166 OT EVAL MOD COMPLEX 45 MIN: CPT

## 2023-03-08 PROCEDURE — 93306 TTE W/DOPPLER COMPLETE: CPT

## 2023-03-08 PROCEDURE — 92611 MOTION FLUOROSCOPY/SWALLOW: CPT

## 2023-03-08 PROCEDURE — 94664 DEMO&/EVAL PT USE INHALER: CPT

## 2023-03-08 PROCEDURE — 80048 BASIC METABOLIC PNL TOTAL CA: CPT | Performed by: STUDENT IN AN ORGANIZED HEALTH CARE EDUCATION/TRAINING PROGRAM

## 2023-03-08 PROCEDURE — 93306 TTE W/DOPPLER COMPLETE: CPT | Performed by: INTERNAL MEDICINE

## 2023-03-08 PROCEDURE — 36415 COLL VENOUS BLD VENIPUNCTURE: CPT | Performed by: STUDENT IN AN ORGANIZED HEALTH CARE EDUCATION/TRAINING PROGRAM

## 2023-03-08 PROCEDURE — 97535 SELF CARE MNGMENT TRAINING: CPT

## 2023-03-08 PROCEDURE — 25010000002 CEFTRIAXONE PER 250 MG: Performed by: STUDENT IN AN ORGANIZED HEALTH CARE EDUCATION/TRAINING PROGRAM

## 2023-03-08 PROCEDURE — 85025 COMPLETE CBC W/AUTO DIFF WBC: CPT | Performed by: STUDENT IN AN ORGANIZED HEALTH CARE EDUCATION/TRAINING PROGRAM

## 2023-03-08 RX ORDER — POTASSIUM CHLORIDE 7.45 MG/ML
10 INJECTION INTRAVENOUS
Status: DISCONTINUED | OUTPATIENT
Start: 2023-03-08 | End: 2023-03-10 | Stop reason: HOSPADM

## 2023-03-08 RX ORDER — POTASSIUM CHLORIDE 1.5 G/1.77G
40 POWDER, FOR SOLUTION ORAL AS NEEDED
Status: DISCONTINUED | OUTPATIENT
Start: 2023-03-08 | End: 2023-03-10 | Stop reason: HOSPADM

## 2023-03-08 RX ORDER — MAGNESIUM SULFATE HEPTAHYDRATE 40 MG/ML
2 INJECTION, SOLUTION INTRAVENOUS AS NEEDED
Status: DISCONTINUED | OUTPATIENT
Start: 2023-03-08 | End: 2023-03-10 | Stop reason: HOSPADM

## 2023-03-08 RX ORDER — MAGNESIUM SULFATE HEPTAHYDRATE 40 MG/ML
4 INJECTION, SOLUTION INTRAVENOUS AS NEEDED
Status: DISCONTINUED | OUTPATIENT
Start: 2023-03-08 | End: 2023-03-10 | Stop reason: HOSPADM

## 2023-03-08 RX ORDER — POTASSIUM CHLORIDE 750 MG/1
40 TABLET, FILM COATED, EXTENDED RELEASE ORAL AS NEEDED
Status: DISCONTINUED | OUTPATIENT
Start: 2023-03-08 | End: 2023-03-10 | Stop reason: HOSPADM

## 2023-03-08 RX ORDER — MAGNESIUM SULFATE 1 G/100ML
1 INJECTION INTRAVENOUS AS NEEDED
Status: DISCONTINUED | OUTPATIENT
Start: 2023-03-08 | End: 2023-03-10 | Stop reason: HOSPADM

## 2023-03-08 RX ADMIN — METOPROLOL TARTRATE 50 MG: 50 TABLET, FILM COATED ORAL at 20:42

## 2023-03-08 RX ADMIN — GUAIFENESIN 400 MG: 200 SOLUTION ORAL at 11:01

## 2023-03-08 RX ADMIN — BARIUM SULFATE 50 ML: 400 SUSPENSION ORAL at 09:33

## 2023-03-08 RX ADMIN — BARIUM SULFATE 55 ML: 0.81 POWDER, FOR SUSPENSION ORAL at 09:33

## 2023-03-08 RX ADMIN — Medication 250 MG: at 08:17

## 2023-03-08 RX ADMIN — GUAIFENESIN 400 MG: 200 SOLUTION ORAL at 05:09

## 2023-03-08 RX ADMIN — Medication 300 MG: at 17:07

## 2023-03-08 RX ADMIN — BARIUM SULFATE 4 ML: 980 POWDER, FOR SUSPENSION ORAL at 09:33

## 2023-03-08 RX ADMIN — Medication 250 MG: at 20:42

## 2023-03-08 RX ADMIN — BUDESONIDE 0.5 MG: 0.5 INHALANT ORAL at 20:26

## 2023-03-08 RX ADMIN — LANSOPRAZOLE 15 MG: 15 TABLET, ORALLY DISINTEGRATING, DELAYED RELEASE ORAL at 05:09

## 2023-03-08 RX ADMIN — POTASSIUM CHLORIDE 40 MEQ: 1.5 POWDER, FOR SOLUTION ORAL at 22:48

## 2023-03-08 RX ADMIN — BUDESONIDE 0.5 MG: 0.5 INHALANT ORAL at 07:24

## 2023-03-08 RX ADMIN — SENNOSIDES 1 TABLET: 8.6 TABLET, FILM COATED ORAL at 20:42

## 2023-03-08 RX ADMIN — POTASSIUM CHLORIDE 40 MEQ: 1.5 POWDER, FOR SOLUTION ORAL at 17:40

## 2023-03-08 RX ADMIN — ATORVASTATIN CALCIUM 80 MG: 80 TABLET, FILM COATED ORAL at 20:42

## 2023-03-08 RX ADMIN — MIRTAZAPINE 15 MG: 15 TABLET, FILM COATED ORAL at 20:42

## 2023-03-08 RX ADMIN — GUAIFENESIN 400 MG: 200 SOLUTION ORAL at 22:48

## 2023-03-08 RX ADMIN — METOPROLOL TARTRATE 50 MG: 50 TABLET, FILM COATED ORAL at 08:17

## 2023-03-08 RX ADMIN — LEVOTHYROXINE SODIUM 75 MCG: 0.07 TABLET ORAL at 08:17

## 2023-03-08 RX ADMIN — GUAIFENESIN 400 MG: 200 SOLUTION ORAL at 17:07

## 2023-03-08 RX ADMIN — ASPIRIN 81 MG: 81 TABLET, COATED ORAL at 08:17

## 2023-03-08 RX ADMIN — TRAZODONE HYDROCHLORIDE 50 MG: 50 TABLET ORAL at 20:42

## 2023-03-08 RX ADMIN — SODIUM CHLORIDE 75 ML/HR: 9 INJECTION, SOLUTION INTRAVENOUS at 22:48

## 2023-03-08 RX ADMIN — CEFTRIAXONE SODIUM 1 G: 1 INJECTION, POWDER, FOR SOLUTION INTRAMUSCULAR; INTRAVENOUS at 20:42

## 2023-03-08 NOTE — PLAN OF CARE
Goal Outcome Evaluation:  Plan of Care Reviewed With: patient           Outcome Evaluation: VFSS completed. Pt had severe oropharyngeal dysphagia with silent penetration of all consistencies during or after the swallow from significant residuals. Pt was unable to clear with leading to additional penetration and trace aspiration. Pt is not safe for PO diet at this time. Recommend NPO w/ meds and nutrition via PEG; frequent oral care; ice chips or sips of water ok after oral care. ST to follow. Recommend ST at next level of care.

## 2023-03-08 NOTE — PROGRESS NOTES
Name: Kiya Cramer ADMIT: 3/6/2023   : 1935  PCP: System, Provider Not In    MRN: [id number] LOS: 0 days   AGE/SEX: 87 y.o. female  ROOM: [facility]/   Subjective   Chief Complaint   Patient presents with   • Speech Problem      She has been having increased nasal drainage. Has chronic issues with aspiration. Not reporting worse SOA. Cough present at times. No CP. No NVD or abdominal pain.    Discussed with caregiver at bedside. Discussed with her daughter over the phone. Family concerned about weakness and reports PNA which was severe several months ago. Also report worsened speech and difficulty swallowing with prior UTI. They were anticipating that she may need rehab, hopefully acute, since that has been helpful for her in the past. They crush most medications and give via PEG at home with caregivers assisting. Have ongoing ST and HH outpatient previously.    Objective   Vital Signs  Temp:  [93.6 °F (34.2 °C)-98.1 °F (36.7 °C)] 98.1 °F (36.7 °C)  Heart Rate:  [60-80] 60  Resp:  [16] 16  BP: (133-156)/(77-91) 149/84  SpO2:  [93 %-100 %] 93 %  on   ;   Device (Oxygen Therapy): room air  Body mass index is 16.42 kg/m².    Physical Exam  Vitals and nursing note reviewed.   Constitutional:       General: She is not in acute distress.     Appearance: She is not diaphoretic.   HENT:      Head: Atraumatic.   Eyes:      General:         Right eye: No discharge.         Left eye: No discharge.      Conjunctiva/sclera: Conjunctivae normal.   Cardiovascular:      Rate and Rhythm: Normal rate and regular rhythm.   Pulmonary:      Effort: Pulmonary effort is normal.      Breath sounds: Rhonchi (left) present. No wheezing.   Abdominal:      General: There is no distension.      Palpations: Abdomen is soft.   Musculoskeletal:         General: No swelling or tenderness.      Cervical back: Neck supple. No tenderness.   Skin:     General: Skin is warm and dry.   Neurological:      Mental Status: She is alert. Mental  status is at baseline.      Comments: Slow deliberate speech   Psychiatric:         Mood and Affect: Mood normal.         Behavior: Behavior normal.         Results Review:       I reviewed the patient's new clinical results.     I reviewed imaging, agree with interpretation.     I reviewed telemetry/EKG results, sinus with rbbb     I reviewed other test results, agree with interpretation.    Results from last 7 days   Lab Units 03/08/23 0620 03/07/23 0629 03/06/23  2351   WBC 10*3/mm3 5.31 7.82 9.98   HEMOGLOBIN g/dL 11.5* 11.6* 12.5   PLATELETS 10*3/mm3 198 246 230     Results from last 7 days   Lab Units 03/08/23 0620 03/07/23 0629 03/06/23  2351   SODIUM mmol/L 134* 133* 129*   POTASSIUM mmol/L 3.2* 3.8 3.7   CHLORIDE mmol/L 97* 91* 89*   CO2 mmol/L 29.0 30.0* 29.0   BUN mg/dL 12 20 26*   CREATININE mg/dL 0.32* 0.40* 0.64   GLUCOSE mg/dL 89 90 100*   Estimated Creatinine Clearance: 99.4 mL/min (A) (by C-G formula based on SCr of 0.32 mg/dL (L)).  Results from last 7 days   Lab Units 03/08/23 0620 03/07/23 0629 03/06/23  2351   CALCIUM mg/dL 8.5* 9.0 9.9   ALBUMIN g/dL  --  3.1* 3.6   MAGNESIUM mg/dL  --   --  2.1     Results from last 7 days   Lab Units 03/06/23  2351   INR  0.98   APTT seconds 33.2        aspirin, 81 mg, Oral, Daily  atorvastatin, 80 mg, Oral, Nightly  budesonide, 0.5 mg, Nebulization, BID - RT  cefTRIAXone, 1 g, Intravenous, Q24H  ferrous sulfate, 300 mg, Oral, Q PM  guaifenesin, 400 mg, Oral, Q6H  lansoprazole, 15 mg, Oral, Q AM  levothyroxine, 75 mcg, Oral, Daily  metoprolol tartrate, 50 mg, Oral, BID  mirtazapine, 15 mg, Oral, Nightly  saccharomyces boulardii, 250 mg, Oral, BID  senna, 1 tablet, Oral, Nightly  traZODone, 50 mg, Oral, Nightly      sodium chloride, 75 mL/hr, Last Rate: 75 mL/hr (03/07/23 8103)    NPO Diet NPO Type: Strict NPO    Assessment & Plan      Active Hospital Problems    Diagnosis  POA   • **Altered mental status, unspecified altered mental status type [R41.82]   Yes   • UTI (urinary tract infection) due to urinary indwelling catheter (HCC) [T83.511A, N39.0]  Unknown   • CAP (community acquired pneumonia) [J18.9]  Unknown   • Oropharyngeal dysphagia [R13.12]  Unknown   • Iron deficiency anemia [D50.9]  Unknown   • Paroxysmal atrial fibrillation (HCC) [I48.0]  Yes   • Acquired hypothyroidism [E03.9]  Yes      Resolved Hospital Problems   No resolved problems to display.       · PNA: Infiltrate.  Potential sinusitis with likely aspiration component.  Infiltrate is left sided however. Not hypoxic. RVP ok. Procal not elevated. Monitor with rocephin.  · UTI: Cx negative. Abx as above.  · Dysphagia: Continue SLP. Nutrition consult. Chronic issue per family.  · PAF: Sinus currently. Not on chronic  AC since she had a brief single episode of afib previously. Not in afib on ekg this admit.  · JEFF:   Monitoring  · Depression: Continue home regimen.  · Hypothyroidism: Synthroid  · Metabolic Encephalopathy: exacerbation 2/2 aspiration and pna. Improving. No acute stroke on MRI. Appreciate neurology evaluation.  · PPx: SCD  · Disposition: Potential rehab. 1-2 days.    Neel Guevara MD  [facility]  03/08/23  16:38 EST    Dictated portions using Dragon dictation software.    During the entire encounter, I was wearing recommended PPE including face mask and eye protection. Hand sanitization was performed prior to entering room and upon exit.

## 2023-03-08 NOTE — MBS/VFSS/FEES
Acute Care - Speech Language Pathology   Swallow Initial Evaluation [facility]     Patient Name: Kiya Cramer  : 1935  MRN: [id number]  Today's Date: 3/8/2023               Admit Date: 3/6/2023    Visit Dx:     ICD-10-CM ICD-9-CM   1. Altered mental status, unspecified altered mental status type  R41.82 780.97   2. Urinary tract infection without hematuria, site unspecified  N39.0 599.0   3. 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)   • Palpitations   • Vitamin D deficiency   • Paroxysmal atrial fibrillation (HCC)   • Altered mental status, unspecified altered mental status type   • UTI (urinary tract infection) due to urinary indwelling catheter (HCC)   • CAP (community acquired pneumonia)   • Oropharyngeal dysphagia   • Iron deficiency anemia     Past Medical History:   Diagnosis Date   • Acquired hypothyroidism 2015   • Health care maintenance    • Heart murmur    • Hypertension    • Mitral regurgitation     Trace to mild per 2-D echocardiogram 3/25/2013   • Mitral valve prolapse     Mild per 2-D echocardiogram 2016-trace mitral valve regurgitation   • Palpitations 2016    Description: had a prior holter with 35 sec of AF; stopped dilt and pradaxa in 3/13; added metoprolol; more palp and will get event recorder; event recorder was normal   • Paroxysmal atrial fibrillation (HCC)     History of 35 second run of paroxysmal atrial fibrillation per Holter; The patient previously was on diltiazem and this was discontinued due to adverse effects; she was on Pradaxa but has an unsteady gait and this was discontinued; she has remained on metoprolol tartrate   • Right bundle branch block     Chronic   • 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 for Method of Medication Administration: meds via alternate route (03/08/23 1000)     Monitor for Signs of Aspiration: yes (03/08/23 1000)     Swallow Criteria for Skilled Therapeutic Interventions Met: demonstrates skilled criteria (03/08/23 1000)  Anticipated Discharge Disposition (SLP): unknown, anticipate therapy at next level of care (03/08/23 1000)  Rehab Potential/Prognosis, Swallowing: adequate, monitor progress closely (03/08/23 1000)  Therapy Frequency (Swallow): PRN (03/08/23 1000)  Predicted Duration Therapy Intervention (Days): until discharge (03/08/23 1000)  Demonstrates Need for Referral to Another Service: speech therapy (03/08/23 1000)                                     Plan of Care Reviewed With: patient  Outcome Evaluation: VFSS completed. Pt had severe oropharyngeal dysphagia with silent penetration of all consistencies during or after the swallow from significant residuals. Pt was unable to clear, high risk of aspiration. Pt is not safe for PO diet at this time. Recommend NPO w/ meds and nutrition via PEG; frequent oral care; ice chips or sips of water ok after oral care. ST to follow. Recommend ST at next level of care.      SWALLOW EVALUATION (last 72 hours)     SLP Adult Swallow Evaluation     Row Name 03/08/23 1000 03/07/23 1400                Rehab Evaluation    Document Type evaluation  -AW evaluation  -SH       Subjective Information no complaints  -AW --       Patient Observations  alert;cooperative;agree to therapy  -AW --       Patient Effort good  -AW --       Symptoms Noted During/After Treatment none  -AW --          General Information    Patient Profile Reviewed yes  -AW yes  -SH       Pertinent History Of Current Problem Pt admitted with AMS, UTI, and possible PNA. Pt has a long h/o dysphagia and has PEG  -AW UTI, possible pna, transient dysarthria  -SH       Current Method of Nutrition NPO;gastrostomy feedings  -AW NPO  -SH       Precautions/Limitations, Vision WFL;for purposes of eval  -AW WFL;for purposes of eval  -SH       Precautions/Limitations, Hearing WFL;for purposes of eval  -AW WFL;for purposes of eval  -SH       Prior Level of Function-Communication cognitive-linguistic impairment  -AW cognitive-linguistic impairment  -SH       Prior Level of Function-Swallowing soft to chew;thin liquids;alternative feeding method  -AW soft to chew;thin liquids  -       Plans/Goals Discussed with patient;agreed upon  -AW patient;agreed upon;other (see comments)  caregiver  -       Barriers to Rehab medically complex;previous functional deficit  -AW medically complex  -       Patient's Goals for Discharge return to PO diet  -AW --          Pain    Additional Documentation Pain Scale: Numbers Pre/Post-Treatment (Group)  -AW Pain Scale: FACES Pre/Post-Treatment (Group)  -SH          Pain Scale: Numbers Pre/Post-Treatment    Pretreatment Pain Rating 0/10 - no pain  -AW --       Posttreatment Pain Rating 0/10 - no pain  -AW --          Pain Scale: FACES Pre/Post-Treatment    Pain: FACES Scale, Pretreatment -- 4-->hurts little more  -SH          Oral Motor Structure and Function    Dentition Assessment natural, present and adequate  -AW natural, present and adequate  -       Secretion Management anterior loss  -AW --       Mucosal Quality cracked  -AW --          Oral Musculature and Cranial Nerve Assessment    Oral Motor General Assessment generalized oral motor weakness  -AW generalized  oral motor weakness  -          General Eating/Swallowing Observations    Respiratory Support Currently in Use room air  -AW --       Eating/Swallowing Skills self-fed;fed by SLP  -AW --       Positioning During Eating upright in bed  -AW --          Clinical Swallow Eval    Clinical Swallow Evaluation Summary -- Patient seen for clinical swallow assessment. Pt with hx of recurrent dysphagia following cervical fracture last Spring. She was hospitalized at [facility] and went to [facility] for rehab per caregiver. Pt was hospitalized in the fall and MBSS completed 8/18/22 revealed MOD dysphagia, but study 9/30/22 revealed severe dysphagia with silent aspiration of nectar and thins. The remaining dysphagia hx was provided from caregiver, Berkley. Pt did recover her swallow by Thanksgiving, but was hospitalized following that holiday and had to again recover her swallow function. She was NPO when discharged to [place], but progressed to PO at SNF. Pt is current with home health and has SLP services. Prior to this event, patient was eating a soft diet and using her PEG for some meds. Pt developed coughing with puree. Pt weak, but no focal weakness noted in oral mech exam. No overt s/s of aspiration with ice or thins via cup/straw. Throat clearing with thins via spoon. Cough, grimace, and multiple swallows with puree. SLP recs free water protocol. Use PEG for meds. Will follow with VFSS to further assess swallow function next date.  -          MBS/VFSS    Utensils Used spoon;cup  -AW --       Consistencies Trialed thin liquids;nectar/syrup-thick liquids;honey-thick liquids;pureed  -AW --          MBS/VFSS Interpretation    VFSS Summary VFSS completed with Dr. Batista. Pt exhibited severe oropharyngeal dysphagia characterized by decreased hyolaryngeal excursion, absent epiglottic deflection, decreased pharyngeal constriction, impacted by inward curvature of cervical spine. Pt given trials of thin (cup), NTL (cup), HTL  (cup/tsp), and pureed. With all, pt swallowed multiple times (3-5). Silent penetration with posterior silent aspiration occurred with thins. Moderate pharyngeal residuals were noted with all consistencies with silent penetration of all and  high risk of aspiration. Pt was unable to clear penetrated material with cued cough and had additional penetration noted with additional swallows with trace aspiration noted of nectar thick, and honey thick after pureed trial. Chin tuck was not effective in preventing penetration or assisting in clearing residuals. Pt is not safe for PO diet at this time. Recommend NPO w/ meds and nutrition via PEG; frequent oral care; ice chips or sips of water ok after oral care. ST to follow. Recommend ST at next level of care.  -AW --          SLP Communication to Radiology    Summary Statement VFSS completed with Dr. Batista. Pt exhibited severe oropharyngeal dysphagia characterized by decreased hyolaryngeal excursion, absent epiglottic deflection, decreased pharyngeal constriction, impacted by inward curvature of cervical spine. Pt given trials of thin (cup), NTL (cup), HTL (cup/tsp), and pureed. With all, pt swallowed multiple times (3-5). Silent penetration with posterior silent aspiration occurred with thins. Moderate pharyngeal residuals were noted with all consistencies with silent penetration of all and  high risk of aspiration. Pt was unable to clear penetrated material with cued cough and had additional penetration noted with additional swallows with trace aspiration noted of nectar thick, and honey thick after pureed trial. Chin tuck was not effective in preventing penetration or assisting in clearing residuals.  -AW --          SLP Evaluation Clinical Impression    SLP Swallowing Diagnosis severe;oral dysphagia;pharyngeal dysphagia  -AW suspected pharyngeal dysphagia  -SH       Functional Impact risk of aspiration/pneumonia  -AW --       Rehab Potential/Prognosis, Swallowing  adequate, monitor progress closely  -AW --       Swallow Criteria for Skilled Therapeutic Interventions Met demonstrates skilled criteria  -AW --          Recommendations    Therapy Frequency (Swallow) PRN  -AW PRN  -       Predicted Duration Therapy Intervention (Days) until discharge  - until discharge  -       SLP Diet Recommendation NPO;long term alternate methods of nutrition/hydration;ice chips between meals after oral care, with supervision;water between meals after oral care, with supervision  -AW water between meals after oral care, with supervision;ice chips between meals after oral care, with supervision  -       Recommended Diagnostics -- reassess via VFSS ([facility])  -       Oral Care Recommendations Oral Care BID/PRN  -AW Oral Care BID/PRN;Before ice/water  -       SLP Rec. for Method of Medication Administration meds via alternate route  - meds via alternate route  -       Monitor for Signs of Aspiration yes  -AW yes;notify SLP if any concerns  -       Anticipated Discharge Disposition (SLP) unknown;anticipate therapy at next level of care  - anticipate therapy at next level of care  -       Demonstrates Need for Referral to Another Service speech therapy  - --          Swallow Goals (SLP)    Swallow LTGs -- Patient will demonstrate functional swallow for  -          (LTG) Patient will demonstrate functional swallow for    Diet Texture (Demonstrate functional swallow) -- soft to chew (whole) textures  -       Liquid viscosity (Demonstrate functional swallow) -- thin liquids  -       [place] (Demonstrate functional swallow) -- independently (over 90% accuracy)  -             User Key  (r) = Recorded By, (t) = Taken By, (c) = Cosigned By    Initials Name Effective Dates     Concepcion Carrero MS [facility]-SLP 06/16/21 -      Luz Prince MS CCC-SLP 06/16/21 -                 EDUCATION  The patient has been educated in the following areas:   Dysphagia (Swallowing Impairment)  Oral Care/Hydration NPO rationale.        SLP GOALS     Row Name 03/07/23 1400             (LTG) Patient will demonstrate functional swallow for    Diet Texture (Demonstrate functional swallow) soft to chew (whole) textures  -      Liquid viscosity (Demonstrate functional swallow) thin liquids  -      [place] (Demonstrate functional swallow) independently (over 90% accuracy)  -            User Key  (r) = Recorded By, (t) = Taken By, (c) = Cosigned By    Initials Name Provider Type     Luz Prince MS CCC-SLP Speech and Language Pathologist                   Time Calculation:    Time Calculation- SLP     Row Name 03/08/23 1111             Time Calculation- SLP    SLP Start Time 0915  -      SLP Received On 03/08/23  -            User Key  (r) = Recorded By, (t) = Taken By, (c) = Cosigned By    Initials Name Provider Type    Concepcion Zamora MS CCC-SLP Speech and Language Pathologist                Therapy Charges for Today     Code Description Service Date Service Provider Modifiers Qty    63093304375 HC ST MOTION FLUORO EVAL SWALLOW 5 3/8/2023 Concepcion Carrero, MS CCC-SLP GN 1               Concepcion Carrero MS CCC-SLP  3/8/2023

## 2023-03-08 NOTE — PLAN OF CARE
Problem: Adult Inpatient Plan of Care  Goal: Plan of Care Review  Outcome: Ongoing, Progressing  Flowsheets (Taken 3/7/2023 2129)  Plan of Care Reviewed With: patient     Problem: Aspiration (Enteral Nutrition)  Goal: Absence of Aspiration Signs and Symptoms  Outcome: Ongoing, Progressing  Intervention: Minimize Aspiration Risk  Recent Flowsheet Documentation  Taken 3/7/2023 2000 by Claudia Davidson RN  Head of Bed (HOB) Positioning: HOB at 30 degrees     Problem: Device-Related Complication Risk (Enteral Nutrition)  Goal: Safe, Effective Therapy Delivery  Outcome: Ongoing, Progressing     Problem: Feeding Intolerance (Enteral Nutrition)  Goal: Feeding Tolerance  Outcome: Ongoing, Progressing     Problem: Skin Injury Risk Increased  Goal: Skin Health and Integrity  Outcome: Ongoing, Progressing  Intervention: Optimize Skin Protection  Recent Flowsheet Documentation  Taken 3/7/2023 2000 by Claudia Davidson RN  Pressure Reduction Techniques: frequent weight shift encouraged  Head of Bed (HOB) Positioning: HOB at 30 degrees  Pressure Reduction Devices: positioning supports utilized  Skin Protection: adhesive use limited     Problem: Fall Injury Risk  Goal: Absence of Fall and Fall-Related Injury  Outcome: Ongoing, Progressing  Intervention: Identify and Manage Contributors  Recent Flowsheet Documentation  Taken 3/7/2023 2000 by Claudia Davidson RN  Medication Review/Management: medications reviewed  Intervention: Promote Injury-Free Environment  Recent Flowsheet Documentation  Taken 3/7/2023 2000 by Claudia Davidson RN  Safety Promotion/Fall Prevention: safety round/check completed   Goal Outcome Evaluation:  Plan of Care Reviewed With: patient

## 2023-03-08 NOTE — PLAN OF CARE
Goal Outcome Evaluation:  Plan of Care Reviewed With: patient, family        Progress: no change     No new events during this shift

## 2023-03-08 NOTE — PROGRESS NOTES
[facility] Acute Rehab  Referral received. Noted [facility] is 1st choice. Will continue to follow for progress with therapy.     Destiny GreenRN  Acute Rehab Admission Nurse    1772 spoke with pt and caregiver (at bedside). Acute Rehab discussed. Pt states rk has been at [facility] before. She is unsure if she would like to go to rehab or home with 24/7 caregivers. We will follow

## 2023-03-08 NOTE — PLAN OF CARE
Goal Outcome Evaluation:  Plan of Care Reviewed With: patient, caregiver        Progress: no change  Outcome Evaluation: Patient vital signs are being monitored. Temperature is below the normal. MD is aware about it. Caregiver is at bedside. Patient is compliant to every treatment. Comfort measures is provided. Will still keep close monitoring to the patient.

## 2023-03-08 NOTE — PLAN OF CARE
Goal Outcome Evaluation:  Plan of Care Reviewed With: patient, caregiver           Outcome Evaluation: OT-Pt. admitted with slurred speech, UTI, altered mental status. R/o stroke per RN. PLOF-Received 24 hr. care with Home instead. Required min A for self care about 3 weeks before admission wx level. Has a tub bench, wx, rollator and w/c at home. No pain c/o at present. Pt. presents below baseline with decreased functional endurance Fair-, strength, act. tolerance, ADL performance. LE dressing mod A socks seated EOB. Grooming SBA after set up. Tube feeding at present. Sit to stand Pal. Pt. may benefit from OT to address deficits and increase indep with self care/safety. Anticipate rehab or home with assist and HH as needed.    Patient was not wearing a face mask during this therapy encounter. Therapist used appropriate personal protective equipment including mask and gloves.  Mask used was standard procedure mask. Appropriate PPE was worn during the entire therapy session. Hand hygiene was completed before and after therapy session. Patient is not in enhanced droplet precautions.

## 2023-03-08 NOTE — THERAPY EVALUATION
Patient Name: Kiya Cramer  : 1935    MRN: [id number]                              Today's Date: 3/8/2023       Admit Date: 3/6/2023    Visit Dx:     ICD-10-CM ICD-9-CM   1. Altered mental status, unspecified altered mental status type  R41.82 780.97   2. Urinary tract infection without hematuria, site unspecified  N39.0 599.0   3. 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)   • Palpitations   • Vitamin D deficiency   • Paroxysmal atrial fibrillation (HCC)   • Altered mental status, unspecified altered mental status type   • UTI (urinary tract infection) due to urinary indwelling catheter (HCC)   • CAP (community acquired pneumonia)   • Oropharyngeal dysphagia   • Iron deficiency anemia     Past Medical History:   Diagnosis Date   • Acquired hypothyroidism 2015   • Health care maintenance    • Heart murmur    • Hypertension    • Mitral regurgitation     Trace to mild per 2-D echocardiogram 3/25/2013   • Mitral valve prolapse     Mild per 2-D echocardiogram 2016-trace mitral valve regurgitation   • Palpitations 2016    Description: had a prior holter with 35 sec of AF; stopped dilt and pradaxa in 3/13; added metoprolol; more palp and will get event recorder; event recorder was normal   • Paroxysmal atrial fibrillation (HCC)     History of 35 second run of paroxysmal atrial fibrillation per Holter; The patient previously was on diltiazem and this was discontinued due to adverse effects; she was on Pradaxa but has an unsteady gait and this was discontinued; she has remained on metoprolol tartrate   • Right bundle branch block     Chronic   • 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    Description: NA was 130 in 3/13   • Tricuspid regurgitation     Mild to moderate per 2-D echocardiogram 2016   • Unsteady gait    • Vitamin D  deficiency      Past Surgical History:   Procedure Laterality Date   • HYSTERECTOMY        General Information     Row Name 03/08/23 0833          OT Time and Intention    Document Type evaluation  -CW     Mode of Treatment occupational therapy  -CW     Row Name 03/08/23 0833          General Information    Patient Profile Reviewed yes  -CW     Prior Level of Function min assist:;ADL's  Received assistance 24 hour care [facility]  -CW     Existing Precautions/Restrictions fall  -CW     Barriers to Rehab medically complex  -CW     Row Name 03/08/23 0833          Cognition    Orientation Status (Cognition) oriented x 3  -CW     Row Name 03/08/23 0833          Safety Issues, Functional Mobility    Impairments Affecting Function (Mobility) endurance/activity tolerance;balance;strength  -CW           User Key  (r) = Recorded By, (t) = Taken By, (c) = Cosigned By    Initials Name Provider Type    CW Lisa Reyes OTR Occupational Therapist                 Mobility/ADL's     Row Name 03/08/23 0836          Bed Mobility    Bed Mobility sit-supine  -CW     Sit-Supine [place] (Bed Mobility) minimum assist (75% patient effort);verbal cues  -CW     Assistive Device (Bed Mobility) bed rails;head of bed elevated  -CW     Row Name 03/08/23 0836          Transfers    Transfers sit-stand transfer  -CW     Row Name 03/08/23 08          Sit-Stand Transfer    Sit-Stand [place] (Transfers) minimum assist (75% patient effort);verbal cues  -CW     Row Name 03/08/23 0836          Activities of Daily Living    BADL Assessment/Intervention lower body dressing;grooming  -CW     Row Name 03/08/23 0836          Lower Body Dressing Assessment/Training    [place] Level (Lower Body Dressing) lower body dressing skills;doff;don;socks;moderate assist (50% patient effort);verbal cues  -CW     Position (Lower Body Dressing) edge of bed sitting  -CW     Row Name 03/08/23 0836          Grooming Assessment/Training    [place]  Level (Grooming) grooming skills;hair care, combing/brushing;set up;standby assist  -CW     Position (Grooming) edge of bed sitting  -CW           User Key  (r) = Recorded By, (t) = Taken By, (c) = Cosigned By    Initials Name Provider Type    CW Lisa Reyes OTR Occupational Therapist               Obj/Interventions     Row Name 03/08/23 0838          Range of Motion Comprehensive    General Range of Motion bilateral upper extremity ROM WNL  -CW     Row Name 03/08/23 0838          Strength Comprehensive (MMT)    Comment, General Manual Muscle Testing (MMT) Assessment Generalized weakness BUE's 3+/5  -CW     Row Name 03/08/23 0838          Motor Skills    Motor Skills functional endurance  -CW     Functional Endurance fair-  -CW     Row Name 03/08/23 0838          Balance    Balance Assessment standing static balance  -CW     Static Standing Balance contact guard;verbal cues  -CW           User Key  (r) = Recorded By, (t) = Taken By, (c) = Cosigned By    Initials Name Provider Type     Lisa Reyes OTR Occupational Therapist               Goals/Plan     Row Name 03/08/23 0847          Transfer Goal 1 (OT)    Activity/Assistive Device (Transfer Goal 1, OT) sit-to-stand/stand-to-sit;bed-to-chair/chair-to-bed;toilet;commode, 3-in-1;walker, rolling  -CW     [place] Level/Cues Needed (Transfer Goal 1, OT) standby assist  -CW     Time Frame (Transfer Goal 1, OT) 2 weeks;by discharge  -     Row Name 03/08/23 0847          Dressing Goal 1 (OT)    Activity/Device (Dressing Goal 1, OT) dressing skills, all;upper body dressing;lower body dressing  -CW     [place]/Cues Needed (Dressing Goal 1, OT) contact guard required  -CW     Time Frame (Dressing Goal 1, OT) 2 weeks;by discharge  -     Row Name 03/08/23 0847          Therapy Assessment/Plan (OT)    Planned Therapy Interventions (OT) activity tolerance training;BADL retraining;patient/caregiver education/training;transfer/mobility retraining  -CW            User Key  (r) = Recorded By, (t) = Taken By, (c) = Cosigned By    Initials Name Provider Type    Lisa Avila OTR Occupational Therapist               Clinical Impression     Row Name 03/08/23 0840          Pain Assessment    Pretreatment Pain Rating 0/10 - no pain  -CW     Posttreatment Pain Rating 0/10 - no pain  -CW     Row Name 03/08/23 0840          Plan of Care Review    Plan of Care Reviewed With patient;caregiver  -CW     Outcome Evaluation OT-Pt. admitted with slurred speech, UTI, altered mental status. R/o stroke per RN. PLOF-Received 24 hr. care with Home instead. Required min A for self care about 3 weeks before admission wx level. Has a tub bench, wx, rollator and w/c at home. No pain c/o at present. Pt. presents below baseline with decreased functional endurance Fair-, strength, act. tolerance, ADL performance. LE dressing mod A socks seated EOB. Grooming SBA after set up. Tube feeding at present. Sit to stand Pal. Pt. may benefit from OT to address deficits and increase indep with self care/safety. Anticipate rehab or home with assist and HH as needed.  -CW     Row Name 03/08/23 0840          Therapy Assessment/Plan (OT)    Therapy Frequency (OT) 5 times/wk  -CW     Row Name 03/08/23 0840          Positioning and Restraints    Pre-Treatment Position in bed  -CW     Post Treatment Position bed  -CW     In Bed call light within reach;encouraged to call for assist;exit alarm on;with family/caregiver;with nsg;sitting EOB  -CW           User Key  (r) = Recorded By, (t) = Taken By, (c) = Cosigned By    Initials Name Provider Type    Lisa Avila OTR Occupational Therapist               Outcome Measures     Row Name 03/08/23 0848          How much help from another is currently needed...    Putting on and taking off regular lower body clothing? 2  -CW     Bathing (including washing, rinsing, and drying) 2  -CW     Toileting (which includes using toilet bed pan or urinal) 1  -CW     Putting on and  taking off regular upper body clothing 3  -CW     Taking care of personal grooming (such as brushing teeth) 3  -CW     Eating meals 1  -CW     AM-PAC 6 Clicks Score (OT) 12  -CW     Row Name 03/08/23 0848          Functional Assessment    Outcome Measure Options AM-PAC 6 Clicks Daily Activity (OT)  -CW           User Key  (r) = Recorded By, (t) = Taken By, (c) = Cosigned By    Initials Name Provider Type    CW Lisa Reyes OTR Occupational Therapist                Occupational Therapy Education     Title: PT OT SLP Therapies (Done)     Topic: Occupational Therapy (Done)     Point: ADL training (Done)     Description:   Instruct learner(s) on proper safety adaptation and remediation techniques during self care or transfers.   Instruct in proper use of assistive devices.              Learning Progress Summary           Patient Acceptance, E, VU by CW at 3/8/2023 0849    Comment: Role of OT and poc. DME at home/safety.                               User Key     Initials Effective Dates Name Provider Type Discipline     06/16/21 -  Lisa Reyes OTR Occupational Therapist OT              OT Recommendation and Plan  Planned Therapy Interventions (OT): activity tolerance training, BADL retraining, patient/caregiver education/training, transfer/mobility retraining  Therapy Frequency (OT): 5 times/wk  Plan of Care Review  Plan of Care Reviewed With: patient, caregiver  Outcome Evaluation: OT-Pt. admitted with slurred speech, UTI, altered mental status. R/o stroke per RN. PLOF-Received 24 hr. care with Home instead. Required min A for self care about 3 weeks before admission wx level. Has a tub bench, wx, rollator and w/c at home. No pain c/o at present. Pt. presents below baseline with decreased functional endurance Fair-, strength, act. tolerance, ADL performance. LE dressing mod A socks seated EOB. Grooming SBA after set up. Tube feeding at present. Sit to stand Pal. Pt. may benefit from OT to address deficits and  increase indep with self care/safety. Anticipate rehab or home with assist and HH as needed.     Time Calculation:    Time Calculation- OT     Row Name 03/08/23 0851             Time Calculation- OT    OT Start Time 0804  -CW      OT Stop Time 0830  -CW      OT Time Calculation (min) 26 min  -CW      Total Timed Code Minutes- OT 8 minute(s)  -CW      OT Received On 03/08/23  -CW      OT - Next Appointment 03/09/23  -CW      OT Goal Re-Cert Due Date 03/22/23  -CW         Timed Charges    06636 - OT Self Care/Mgmt Minutes 8  -CW         Total Minutes    Timed Charges Total Minutes 8  -CW       Total Minutes 8  -CW            User Key  (r) = Recorded By, (t) = Taken By, (c) = Cosigned By    Initials Name Provider Type    CW Lisa Reyes OTR Occupational Therapist              Therapy Charges for Today     Code Description Service Date Service Provider Modifiers Qty    45974201506 HC OT SELF CARE/MGMT/TRAIN EA 15 MIN 3/8/2023 Lisa Reyes OTR GO 1    80487921458 HC OT EVAL MOD COMPLEXITY 2 3/8/2023 Lisa Reyes OTR GO 1               KRISTINE Yoder  3/8/2023

## 2023-03-09 LAB
ANION GAP SERPL CALCULATED.3IONS-SCNC: 6.5 MMOL/L (ref 5–15)
BUN SERPL-MCNC: 9 MG/DL (ref 8–23)
BUN/CREAT SERPL: 25 (ref 7–25)
CALCIUM SPEC-SCNC: 9.5 MG/DL (ref 8.6–10.5)
CHLORIDE SERPL-SCNC: 98 MMOL/L (ref 98–107)
CO2 SERPL-SCNC: 28.5 MMOL/L (ref 22–29)
CREAT SERPL-MCNC: 0.36 MG/DL (ref 0.57–1)
DEPRECATED RDW RBC AUTO: 45.1 FL (ref 37–54)
EGFRCR SERPLBLD CKD-EPI 2021: 99.6 ML/MIN/1.73
ERYTHROCYTE [DISTWIDTH] IN BLOOD BY AUTOMATED COUNT: 13.3 % (ref 12.3–15.4)
GLUCOSE BLDC GLUCOMTR-MCNC: 120 MG/DL (ref 70–130)
GLUCOSE BLDC GLUCOMTR-MCNC: 96 MG/DL (ref 70–130)
GLUCOSE SERPL-MCNC: 113 MG/DL (ref 65–99)
HCT VFR BLD AUTO: 36.4 % (ref 34–46.6)
HGB BLD-MCNC: 12 G/DL (ref 12–15.9)
MAGNESIUM SERPL-MCNC: 2 MG/DL (ref 1.6–2.4)
MCH RBC QN AUTO: 30.9 PG (ref 26.6–33)
MCHC RBC AUTO-ENTMCNC: 33 G/DL (ref 31.5–35.7)
MCV RBC AUTO: 93.8 FL (ref 79–97)
PLATELET # BLD AUTO: 206 10*3/MM3 (ref 140–450)
PMV BLD AUTO: 9.2 FL (ref 6–12)
POTASSIUM SERPL-SCNC: 4.9 MMOL/L (ref 3.5–5.2)
RBC # BLD AUTO: 3.88 10*6/MM3 (ref 3.77–5.28)
SODIUM SERPL-SCNC: 133 MMOL/L (ref 136–145)
WBC NRBC COR # BLD: 7.21 10*3/MM3 (ref 3.4–10.8)

## 2023-03-09 PROCEDURE — 85027 COMPLETE CBC AUTOMATED: CPT | Performed by: INTERNAL MEDICINE

## 2023-03-09 PROCEDURE — 82962 GLUCOSE BLOOD TEST: CPT

## 2023-03-09 PROCEDURE — 80048 BASIC METABOLIC PNL TOTAL CA: CPT | Performed by: INTERNAL MEDICINE

## 2023-03-09 PROCEDURE — 97162 PT EVAL MOD COMPLEX 30 MIN: CPT

## 2023-03-09 PROCEDURE — 97110 THERAPEUTIC EXERCISES: CPT

## 2023-03-09 PROCEDURE — 90791 PSYCH DIAGNOSTIC EVALUATION: CPT

## 2023-03-09 PROCEDURE — 94664 DEMO&/EVAL PT USE INHALER: CPT

## 2023-03-09 PROCEDURE — 94761 N-INVAS EAR/PLS OXIMETRY MLT: CPT

## 2023-03-09 PROCEDURE — 94799 UNLISTED PULMONARY SVC/PX: CPT

## 2023-03-09 PROCEDURE — 97530 THERAPEUTIC ACTIVITIES: CPT

## 2023-03-09 PROCEDURE — 83735 ASSAY OF MAGNESIUM: CPT | Performed by: INTERNAL MEDICINE

## 2023-03-09 PROCEDURE — 25010000002 CEFTRIAXONE PER 250 MG: Performed by: INTERNAL MEDICINE

## 2023-03-09 RX ORDER — CHOLECALCIFEROL (VITAMIN D3) 125 MCG
5 CAPSULE ORAL NIGHTLY PRN
Status: DISCONTINUED | OUTPATIENT
Start: 2023-03-09 | End: 2023-03-10 | Stop reason: HOSPADM

## 2023-03-09 RX ADMIN — ASPIRIN 81 MG: 81 TABLET, COATED ORAL at 09:20

## 2023-03-09 RX ADMIN — TRAZODONE HYDROCHLORIDE 50 MG: 50 TABLET ORAL at 21:07

## 2023-03-09 RX ADMIN — MIRTAZAPINE 15 MG: 15 TABLET, FILM COATED ORAL at 21:07

## 2023-03-09 RX ADMIN — CEFTRIAXONE SODIUM 1 G: 1 INJECTION, POWDER, FOR SOLUTION INTRAMUSCULAR; INTRAVENOUS at 21:07

## 2023-03-09 RX ADMIN — METOPROLOL TARTRATE 50 MG: 50 TABLET, FILM COATED ORAL at 09:20

## 2023-03-09 RX ADMIN — BUDESONIDE 0.5 MG: 0.5 INHALANT ORAL at 20:08

## 2023-03-09 RX ADMIN — ATORVASTATIN CALCIUM 80 MG: 80 TABLET, FILM COATED ORAL at 21:07

## 2023-03-09 RX ADMIN — GUAIFENESIN 400 MG: 200 SOLUTION ORAL at 05:38

## 2023-03-09 RX ADMIN — LANSOPRAZOLE 15 MG: 15 TABLET, ORALLY DISINTEGRATING, DELAYED RELEASE ORAL at 05:38

## 2023-03-09 RX ADMIN — METOPROLOL TARTRATE 50 MG: 50 TABLET, FILM COATED ORAL at 21:07

## 2023-03-09 RX ADMIN — LEVOTHYROXINE SODIUM 75 MCG: 0.07 TABLET ORAL at 09:20

## 2023-03-09 RX ADMIN — SENNOSIDES 1 TABLET: 8.6 TABLET, FILM COATED ORAL at 21:07

## 2023-03-09 RX ADMIN — Medication 250 MG: at 21:07

## 2023-03-09 RX ADMIN — Medication 250 MG: at 09:19

## 2023-03-09 RX ADMIN — GUAIFENESIN 400 MG: 200 SOLUTION ORAL at 16:31

## 2023-03-09 RX ADMIN — Medication 300 MG: at 16:31

## 2023-03-09 RX ADMIN — BUDESONIDE 0.5 MG: 0.5 INHALANT ORAL at 06:39

## 2023-03-09 RX ADMIN — GUAIFENESIN 400 MG: 200 SOLUTION ORAL at 21:52

## 2023-03-09 RX ADMIN — GUAIFENESIN 400 MG: 200 SOLUTION ORAL at 10:46

## 2023-03-09 NOTE — PROGRESS NOTES
Continued Stay Note  [facility]     Patient Name: Kiya Cramer  MRN: [id number]  Today's Date: 3/9/2023    Admit Date: 3/6/2023    [facility]   Discharge Plan     Row Name 03/09/23 1542       Plan    Plan [facility]    Patient/Family in Agreement with Plan yes    Plan Comments [facility] ([facility] and [facility] ([facility]) both accept pt with a bed available tomorrow, 3/10. CCP updated daughter who will make final determination for preference. Packet in CCP office. Awa Garber LCSW               Discharge Codes    No documentation.               Expected Discharge Date and Time     Expected Discharge Date Expected Discharge Time    Mar 10, 2023             Lacie Garber LCSW

## 2023-03-09 NOTE — THERAPY TREATMENT NOTE
Patient Name: Kiya Cramer  : 1935    MRN: [id number]                              Today's Date: 3/9/2023       Admit Date: 3/6/2023    Visit Dx:     ICD-10-CM ICD-9-CM   1. Altered mental status, unspecified altered mental status type  R41.82 780.97   2. Urinary tract infection without hematuria, site unspecified  N39.0 599.0   3. 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)   • Palpitations   • Vitamin D deficiency   • Paroxysmal atrial fibrillation (HCC)   • Altered mental status, unspecified altered mental status type   • UTI (urinary tract infection) due to urinary indwelling catheter (HCC)   • CAP (community acquired pneumonia)   • Oropharyngeal dysphagia   • Iron deficiency anemia     Past Medical History:   Diagnosis Date   • Acquired hypothyroidism 2015   • Health care maintenance    • Heart murmur    • Hypertension    • Mitral regurgitation     Trace to mild per 2-D echocardiogram 3/25/2013   • Mitral valve prolapse     Mild per 2-D echocardiogram 2016-trace mitral valve regurgitation   • Palpitations 2016    Description: had a prior holter with 35 sec of AF; stopped dilt and pradaxa in 3/13; added metoprolol; more palp and will get event recorder; event recorder was normal   • Paroxysmal atrial fibrillation (HCC)     History of 35 second run of paroxysmal atrial fibrillation per Holter; The patient previously was on diltiazem and this was discontinued due to adverse effects; she was on Pradaxa but has an unsteady gait and this was discontinued; she has remained on metoprolol tartrate   • Right bundle branch block     Chronic   • 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    Description: NA was 130 in 3/13   • Tricuspid regurgitation     Mild to moderate per 2-D echocardiogram 2016   • Unsteady gait    • Vitamin D  deficiency      Past Surgical History:   Procedure Laterality Date   • HYSTERECTOMY        General Information     [facility] Name 03/09/23 1232          OT Time and Intention    Document Type therapy note (daily note)  -     Mode of Treatment occupational therapy  poor activity tolerance  -     Row Name 03/09/23 1232          General Information    Existing Precautions/Restrictions fall  PEG tube  -[facility] Name 03/09/23 1232          Cognition    Orientation Status (Cognition) oriented x 3  -     Row Name 03/09/23 1232          Safety Issues, Functional Mobility    Impairments Affecting Function (Mobility) strength;endurance/activity tolerance;balance;postural/trunk control  -           User Key  (r) = Recorded By, (t) = Taken By, (c) = Cosigned By    Initials Name Provider Type     Micheline Isbell, DON Occupational Therapist                 Mobility/ADL's     [facility] Name 03/09/23 1233          Bed Mobility    Bed Mobility sit-supine  -     Sit-Supine [place] (Bed Mobility) verbal cues;contact guard;standby assist  -     Assistive Device (Bed Mobility) head of bed elevated;bed rails  -[facility] Name 03/09/23 1233          Transfers    Transfers sit-stand transfer  -[facility] Name 03/09/23 1233          Sit-Stand Transfer    Sit-Stand [place] (Transfers) verbal cues;minimum assist (75% patient effort)  -     Assistive Device (Sit-Stand Transfers) walker, front-wheeled  -[facility] Name 03/09/23 1233          Functional Mobility    Functional Mobility- Ind. Level minimum assist (75% patient effort)  -     Functional Mobility- Device walker, front-wheeled  -     Functional Mobility- Comment short distance to chair, pt declined further mobility within room/to BR  -     Row Name 03/09/23 1233          Activities of Daily Living    BADL Assessment/Intervention lower body dressing;feeding;toileting  -[facility] Name 03/09/23 1233          Lower Body Dressing Assessment/Training    [place]  Level (Lower Body Dressing) lower body dressing skills;doff;don;socks;moderate assist (50% patient effort);verbal cues  -     Position (Lower Body Dressing) edge of bed sitting  -     Row Name 03/09/23 [facility]          Self-Feeding Assessment/Training    [place] Level (Feeding) dependent (less than 25% patient effort)  -     Comment, (Feeding) PEG tube  -     Row Name 03/09/23 [facility]          Toileting Assessment/Training    Comment, (Toileting) purewick and brief donned, pt declined mobility to BR commode  -           User Key  (r) = Recorded By, (t) = Taken By, (c) = Cosigned By    Initials Name Provider Type    Micheline Cam OT Occupational Therapist               Obj/Interventions     Row Name 03/09/23 [facility]          Motor Skills    Functional Endurance quick to fatigue  -[facility] Name 03/09/23 [facility]          Balance    Balance Assessment sitting static balance;sitting dynamic balance;sit to stand dynamic balance;standing static balance;standing dynamic balance  -     Static Sitting Balance standby assist;supervision  -     Dynamic Sitting Balance contact guard  -     Position, Sitting Balance unsupported;sitting edge of bed  -     Sit to Stand Dynamic Balance minimal assist  -     Static Standing Balance contact guard;minimal assist  -MW     Dynamic Standing Balance minimal assist  -     Position/Device Used, Standing Balance walker, front-wheeled;supported  -     Balance Interventions sitting;standing;sit to stand;supported;static;dynamic;minimal challenge  -     Comment, Balance no overt LOBs, quick to fatigue with short distance to chair  -           User Key  (r) = Recorded By, (t) = Taken By, (c) = Cosigned By    Initials Name Provider Type    Micheline Cam OT Occupational Therapist               Goals/Plan    No documentation.                Clinical Impression     Row Name 03/09/23 1234          Pain Assessment    Pretreatment Pain Rating 0/10 - no pain  -      Posttreatment Pain Rating 0/10 - no pain  -     Row Name 03/09/23 1234          Plan of Care Review    Plan of Care Reviewed With patient  -MW     Progress no change  -MW     Outcome Evaluation Pt seen this AM for OT tx session, pt's caregiver present at beginning of session, pt reports she has 24/7 assist at home, was able to dress hereslf and complete short household distances at baseline with Rwx to BR commode, etc. Pt presents today A&Ox3, PEG in place. Completing bed mob with CGA, SBA for sitting, mod A for LBD. Pt completed STS with  min A using RWx, short distance to chair, pt quick to fatigue and declining further participation with therapy due to fatigue. Pt will continue to benefit from skilled OT to address deficits and progress toward goals, d/c plan pending, home with 24/7 caregiver assist vs SNF.  -     Row Name 03/09/23 1234          Therapy Plan Review/Discharge Plan (OT)    Anticipated Discharge Disposition (OT) home with 24/7 care;skilled nursing facility  -     Row Name 03/09/23 1234          Vital Signs    O2 Delivery Pre Treatment room air  -MW     Pre Patient Position Supine  -MW     Intra Patient Position Standing  -MW     Post Patient Position Sitting  -MW     Row Name 03/09/23 1234          Positioning and Restraints    Pre-Treatment Position in bed  -MW     Post Treatment Position chair  -MW     In Chair notified nsg;reclined;call light within reach;encouraged to call for assist;exit alarm on  -MW           User Key  (r) = Recorded By, (t) = Taken By, (c) = Cosigned By    Initials Name Provider Type    Micheline Cam, OT Occupational Therapist               Outcome Measures     Row Name 03/09/23 1239          How much help from another is currently needed...    Putting on and taking off regular lower body clothing? 2  -MW     Bathing (including washing, rinsing, and drying) 2  -MW     Toileting (which includes using toilet bed pan or urinal) 2  -MW     Putting on and taking  off regular upper body clothing 3  -MW     Taking care of personal grooming (such as brushing teeth) 3  -MW     Eating meals 1  -MW     AM-PAC 6 Clicks Score (OT) 13  -MW     Row Name 03/09/23 1000 03/09/23 0907       How much help from another person do you currently need...    Turning from your back to your side while in flat bed without using bedrails? 3  -EJ 3  -GK    Moving from lying on back to sitting on the side of a flat bed without bedrails? 3  -EJ 3  -GK    Moving to and from a bed to a chair (including a wheelchair)? 3  -EJ 3  -GK    Standing up from a chair using your arms (e.g., wheelchair, bedside chair)? 3  -EJ 3  -GK    Climbing 3-5 steps with a railing? 2  -EJ 2  -GK    To walk in hospital room? 3  -EJ 3  -GK    AM-PAC 6 Clicks Score (PT) 17  -EJ 17  -GK    Highest level of mobility 5 --> Static standing  -EJ 5 --> Static standing  -GK    Row Name 03/09/23 1237          Functional Assessment    Outcome Measure Options AM-PAC 6 Clicks Daily Activity (OT)  -MW           User Key  (r) = Recorded By, (t) = Taken By, (c) = Cosigned By    Initials Name Provider Type    EJ Hanna Mccann, PT Physical Therapist    Micheline Cam, OT Occupational Therapist    Mikayla Srivastava, RN Registered Nurse                Occupational Therapy Education     Title: PT OT SLP Therapies (In Progress)     Topic: Occupational Therapy (Done)     Point: ADL training (Done)     Description:   Instruct learner(s) on proper safety adaptation and remediation techniques during self care or transfers.   Instruct in proper use of assistive devices.              Learning Progress Summary           Patient Acceptance, E, VU by SANTOS at 3/8/2023 0849    Comment: Role of OT and poc. DME at home/safety.                               User Key     Initials Effective Dates Name Provider Type Discipline    SANTOS 06/16/21 -  Lisa Reyes OTR Occupational Therapist OT              OT Recommendation and Plan     Plan of Care Review  Plan of  Care Reviewed With: patient  Progress: no change  Outcome Evaluation: Pt seen this AM for OT tx session, pt's caregiver present at beginning of session, pt reports she has 24/7 assist at home, was able to dress hereslf and complete short household distances at baseline with Rwx to BR commode, etc. Pt presents today A&Ox3, PEG in place. Completing bed mob with CGA, SBA for sitting, mod A for LBD. Pt completed STS with  min A using RWx, short distance to chair, pt quick to fatigue and declining further participation with therapy due to fatigue. Pt will continue to benefit from skilled OT to address deficits and progress toward goals, d/c plan pending, home with 24/7 caregiver assist vs SNF.     Time Calculation:    Time Calculation- OT     Row Name 03/09/23 1237             Time Calculation- OT    OT Start Time 0923  -MW      OT Stop Time 0938  -MW      OT Time Calculation (min) 15 min  -MW      Total Timed Code Minutes- OT 15 minute(s)  -MW      OT Received On 03/09/23  -MW      OT - Next Appointment 03/10/23  -MW         Timed Charges    52755 - OT Therapeutic Activity Minutes 15  -MW         Total Minutes    Timed Charges Total Minutes 15  -MW       Total Minutes 15  -MW            User Key  (r) = Recorded By, (t) = Taken By, (c) = Cosigned By    Initials Name Provider Type    Micheline Cam OT Occupational Therapist              Therapy Charges for Today     Code Description Service Date Service Provider Modifiers Qty    [id number]  OT THERAPEUTIC ACT EA 15 MIN 3/9/2023 Micheline Isbell OT GO 1               Micheline Isbell OT  3/9/2023

## 2023-03-09 NOTE — CONSULTS
[facility] CONSULT      Patient Identification:  NAME:  Kiya Cramer  Age:  87 y.o.   Sex:  female   :  1935   MRN:  [id number]       Chief complaint: AMS    History of present illness:    Kiya Cramer is a 87 y.o. patient with altered mental status    1. Gilda- h/o 1 UTI per year since , reports fatigue and hallucinations are symptoms, last postive UCx Enterococcus in 2022, no h/o stones    2. Constipation    Afib, hypothyroidism, Anemia, dysphagia    CT A/P [facility] 2022: no stones or hydro, no anatomical reasons for UTI     UCx negative  No leukocytosis  sCr normal  UA w/o blood, nitrite neg, 6-12 WBCs      Lab Results   Component Value Date    CREATININE 0.36 (L) 2023     Lab Results   Component Value Date    WBC 7.21 2023             Past medical history:  Past Medical History:   Diagnosis Date   • Acquired hypothyroidism 2015   • Health care maintenance    • Heart murmur    • Hypertension    • Mitral regurgitation     Trace to mild per 2-D echocardiogram 3/25/2013   • Mitral valve prolapse     Mild per 2-D echocardiogram 2016-trace mitral valve regurgitation   • Palpitations 2016    Description: had a prior holter with 35 sec of AF; stopped dilt and pradaxa in 3/13; added metoprolol; more palp and will get event recorder; event recorder was normal   • Paroxysmal atrial fibrillation (HCC)     History of 35 second run of paroxysmal atrial fibrillation per Holter; The patient previously was on diltiazem and this was discontinued due to adverse effects; she was on Pradaxa but has an unsteady gait and this was discontinued; she has remained on metoprolol tartrate   • Right bundle branch block     Chronic   • 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   3/6/2023   • budesonide (PULMICORT) 0.5 MG/2ML nebulizer solution Take 2 mL by nebulization 2 (Two) Times a Day.   3/6/2023   • Latanoprostene Bunod (Vyzulta) 0.024 % solution Apply 1 drop to eye(s) as directed by provider every night at bedtime. Each eye   3/6/2023   • levothyroxine (SYNTHROID, LEVOTHROID) 25 MCG tablet Take 2 tablets by mouth Daily.   3/6/2023   • metoprolol tartrate (LOPRESSOR) 50 MG tablet Take 1 tablet by mouth 2 (Two) Times a Day. 180 tablet 2 3/6/2023   • mirtazapine (REMERON) 30 MG tablet Take 15 mg by mouth Every Night.   3/6/2023   • traZODone (DESYREL) 50 MG tablet Take 1 tablet by mouth Every Night.   3/6/2023   • aspirin 81 MG EC tablet Take 1 tablet by mouth Daily.   Unknown   • BELSOMRA 10 MG tablet Take 10 mg by mouth Daily.   Unknown        Hospital medications:  aspirin, 81 mg, Oral, Daily  atorvastatin, 80 mg, Oral, Nightly  budesonide, 0.5 mg, Nebulization, BID - RT  cefTRIAXone, 1 g, Intravenous, Q24H  ferrous sulfate, 300 mg, Oral, Q PM  guaifenesin, 400 mg, Oral, Q6H  lansoprazole, 15 mg, Oral, Q AM  levothyroxine, 75 mcg, Oral, Daily  metoprolol tartrate, 50 mg, Oral, BID  mirtazapine, 15 mg, Oral, Nightly  saccharomyces boulardii, 250 mg, Oral, BID  senna, 1 tablet, Oral, Nightly  traZODone, 50 mg, Oral, Nightly         •  acetaminophen **OR** acetaminophen  •  albuterol  •  bisacodyl  •  magnesium sulfate **OR** magnesium sulfate in D5W 1g/100mL (PREMIX) **OR** magnesium sulfate  •  melatonin  •  ondansetron  •  potassium chloride **OR** potassium chloride **OR** potassium chloride  •   [COMPLETED] Insert Peripheral IV **AND** sodium chloride    Family history:  Family History   Problem Relation Age of Onset   • Heart disease Mother    • Heart disease Father    • Heart failure Father    • No Known Problems Maternal Grandmother    • No Known Problems Maternal Grandfather    • No Known Problems Paternal Grandmother    • No Known Problems Paternal Grandfather        Social history:  Social History     Tobacco Use   • Smoking status: Never   • Smokeless tobacco: Never   • Tobacco comments:     caffeine use    Vaping Use   • Vaping Use: Former   Substance Use Topics   • Alcohol use: No   • Drug use: No       REVIEW OF SYSTEMS:  Constitutional - Negative for fevers/chills  Eyes/Ears/Nose/Mouth/Throat - Negative for changes in vision  Cardiovascular - Negative for chest pain, dysrhythmia  Respiratory - Negative for dyspnea  Gastrointestinal - Negative for nausea or vomiting  Genitourinary - Negative for dysuria  Hematologic/Lymphatic - Negative for bruising  Skin - Negative for erythema  Endocrine - Negative for polyuria    Objective:  TMax 24 hours:   Temp (24hrs), Av °F (36.1 °C), Min:96.3 °F (35.7 °C), Max:97.6 °F (36.4 °C)      Vitals Ranges:   Temp:  [96.3 °F (35.7 °C)-97.6 °F (36.4 °C)] 96.7 °F (35.9 °C)  Heart Rate:  [76-88] 87  Resp:  [16-18] 18  BP: (139-158)/(77-92) 153/92    Intake/Output Last 3 shifts:  I/O last 3 completed shifts:  In: 270 [Other:70; IV Piggyback:200]  Out: 1400 [Urine:1400]     Physical Exam:    General Appearance:    Alert, cooperative, NAD   HEENT:    Noromocephalic   Back:     No CVA tenderness   Lungs:     Respirations unlabored    Heart:    Reg rate   Abdomen:     Soft, nttp   :    Normal ext genitalia   Extremities:   No deformity   Lymphatic:   No neck or groin LAD   Skin:   Warm and dry   Neuro/Psych:   AAOx3       Results review:   I reviewed the patient's new clinical results.    Data review:  Lab Results (last 24 hours)     Procedure Component Value Units  Date/Time    Basic Metabolic Panel [[id number]]  (Abnormal) Collected: 03/09/23 0648    Specimen: Blood Updated: 03/09/23 0736     Glucose 113 mg/dL      BUN 9 mg/dL      Creatinine 0.36 mg/dL      Sodium 133 mmol/L      Potassium 4.9 mmol/L      Chloride 98 mmol/L      CO2 28.5 mmol/L      Calcium 9.5 mg/dL      BUN/Creatinine Ratio 25.0     Anion Gap 6.5 mmol/L      eGFR 99.6 mL/min/1.73     Narrative:      GFR Normal >60  Chronic Kidney Disease <60  Kidney Failure <15    The GFR formula is only valid for adults with stable renal function between ages 18 and 70.    Magnesium [[id number]]  (Normal) Collected: 03/09/23 0648    Specimen: Blood Updated: 03/09/23 0730     Magnesium 2.0 mg/dL     CBC (No Diff) [[id number]]  (Normal) Collected: 03/09/23 0648    Specimen: Blood Updated: 03/09/23 0715     WBC 7.21 10*3/mm3      RBC 3.88 10*6/mm3      Hemoglobin 12.0 g/dL      Hematocrit 36.4 %      MCV 93.8 fL      MCH 30.9 pg      MCHC 33.0 g/dL      RDW 13.3 %      RDW-SD 45.1 fl      MPV 9.2 fL      Platelets 206 10*3/mm3     POC Glucose Once [[id number]]  (Normal) Collected: 03/09/23 0627    Specimen: Blood Updated: 03/09/23 0629     Glucose 120 mg/dL      Comment: Meter: [id number] : [id number] GONZALEZ       Blood Culture - Blood, Arm, Right [[id number]]  (Normal) Collected: 03/07/23 0239    Specimen: Blood from Arm, Right Updated: 03/09/23 0345     Blood Culture No growth at 2 days    Blood Culture - Blood, Arm, Right [[id number]]  (Normal) Collected: 03/07/23 0258    Specimen: Blood from Arm, Right Updated: 03/09/23 0315     Blood Culture No growth at 2 days    POC Glucose Once [[id number]]  (Normal) Collected: 03/09/23 0044    Specimen: Blood Updated: 03/09/23 0045     Glucose 96 mg/dL      Comment: Meter: [id number] : [id number] Perez Chardonnay LISA              Imaging:  Imaging Results (Last 24 Hours)     ** No results found for the last 24 hours. **             Assessment:       Altered mental  status, unspecified altered mental status type    Acquired hypothyroidism    Paroxysmal atrial fibrillation (HCC)    UTI (urinary tract infection) due to urinary indwelling catheter (HCC)    CAP (community acquired pneumonia)    Oropharyngeal dysphagia    Iron deficiency anemia    Gilda- h/o 1 UTI per year since 2019, fatigue and hallucinations are symptoms, last postive UCx Enterococcus in 11/2022, no h/o stones    Constipation    Afib, hypothyroidism, Anemia, dysphagia    Plan:   Cont adequate fluid intake  Pt w/o UTI on this admission   Deconstipation  Pt to f/u as scheduled on outpatient basis     Tomer Best MD  03/09/23  16:40 EST

## 2023-03-09 NOTE — THERAPY EVALUATION
Patient Name: Kiya Cramer  : 1935    MRN: [id number]                              Today's Date: 3/9/2023       Admit Date: 3/6/2023    Visit Dx:     ICD-10-CM ICD-9-CM   1. Altered mental status, unspecified altered mental status type  R41.82 780.97   2. Urinary tract infection without hematuria, site unspecified  N39.0 599.0   3. 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)   • Palpitations   • Vitamin D deficiency   • Paroxysmal atrial fibrillation (HCC)   • Altered mental status, unspecified altered mental status type   • UTI (urinary tract infection) due to urinary indwelling catheter (HCC)   • CAP (community acquired pneumonia)   • Oropharyngeal dysphagia   • Iron deficiency anemia     Past Medical History:   Diagnosis Date   • Acquired hypothyroidism 2015   • Health care maintenance    • Heart murmur    • Hypertension    • Mitral regurgitation     Trace to mild per 2-D echocardiogram 3/25/2013   • Mitral valve prolapse     Mild per 2-D echocardiogram 2016-trace mitral valve regurgitation   • Palpitations 2016    Description: had a prior holter with 35 sec of AF; stopped dilt and pradaxa in 3/13; added metoprolol; more palp and will get event recorder; event recorder was normal   • Paroxysmal atrial fibrillation (HCC)     History of 35 second run of paroxysmal atrial fibrillation per Holter; The patient previously was on diltiazem and this was discontinued due to adverse effects; she was on Pradaxa but has an unsteady gait and this was discontinued; she has remained on metoprolol tartrate   • Right bundle branch block     Chronic   • 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    Description: NA was 130 in 3/13   • Tricuspid regurgitation     Mild to moderate per 2-D echocardiogram 2016   • Unsteady gait    • Vitamin D  deficiency      Past Surgical History:   Procedure Laterality Date   • HYSTERECTOMY        General Information     Row Name 03/09/23 0950          Physical Therapy Time and Intention    Document Type evaluation  -EJ     Mode of Treatment physical therapy;co-treatment;occupational therapy  -     Row Name 03/09/23 0950          General Information    Patient Profile Reviewed yes  -EJ     Prior Level of Function independent:;min assist:;ADL's;all household mobility  uses walker inside home, has WC, caregivers 24/7 assist as needed  -EJ     Existing Precautions/Restrictions fall  PEG tube  -EJ     Barriers to Rehab medically complex  -EJ     Row Name 03/09/23 0950          Living Environment    People in Home alone;other (see comments)  24/7 caregivers  -EJ     Row Name 03/09/23 0950          Cognition    Orientation Status (Cognition) oriented x 3  -EJ     Row Name 03/09/23 0950          Safety Issues, Functional Mobility    Impairments Affecting Function (Mobility) strength;endurance/activity tolerance;balance  -EJ           User Key  (r) = Recorded By, (t) = Taken By, (c) = Cosigned By    Initials Name Provider Type    EJ Hanna Mccann, PT Physical Therapist               Mobility     Row Name 03/09/23 0951          Bed Mobility    Sit-Supine [place] (Bed Mobility) verbal cues;contact guard;standby assist  -EJ     Assistive Device (Bed Mobility) head of bed elevated;bed rails  -     Row Name 03/09/23 0951          Sit-Stand Transfer    Sit-Stand [place] (Transfers) verbal cues;minimum assist (75% patient effort)  -EJ     Assistive Device (Sit-Stand Transfers) walker, front-wheeled  -EJ     Row Name 03/09/23 0951          Gait/Stairs (Locomotion)    [place] Level (Gait) verbal cues;minimum assist (75% patient effort)  -EJ     Assistive Device (Gait) walker, front-wheeled  -EJ     Distance in Feet (Gait) 3  -EJ     Deviations/Abnormal Patterns (Gait) juan manuel decreased;stride length decreased   -EJ     Bilateral Gait Deviations forward flexed posture;heel strike decreased  -EJ     Comment, (Gait/Stairs) small steps, ambulated from bed to chair, declined further ambulation distance, no overt unsteadiness noted; limited by weakness and fatigue  -EJ           User Key  (r) = Recorded By, (t) = Taken By, (c) = Cosigned By    Initials Name Provider Type    Hanna Chávez, PT Physical Therapist               Obj/Interventions     Row Name 03/09/23 0952          Range of Motion Comprehensive    General Range of Motion no range of motion deficits identified  -[facility] Name 03/09/23 0952          Strength Comprehensive (MMT)    Comment, General Manual Muscle Testing (MMT) Assessment generalized weakness  -     Row Name 03/09/23 0952          Balance    Balance Assessment standing static balance;sitting static balance;standing dynamic balance  -EJ     Static Sitting Balance standby assist;supervision  -EJ     Position, Sitting Balance sitting edge of bed  -EJ     Static Standing Balance contact guard;minimal assist  -EJ     Dynamic Standing Balance minimal assist  -EJ     Position/Device Used, Standing Balance walker, rolling  -EJ           User Key  (r) = Recorded By, (t) = Taken By, (c) = Cosigned By    Initials Name Provider Type     Hanna Mccann, PT Physical Therapist               Goals/Plan     Row Name 03/09/23 0959          Bed Mobility Goal 1 (PT)    Activity/Assistive Device (Bed Mobility Goal 1, PT) bed mobility activities, all  -EJ     [place] Level/Cues Needed (Bed Mobility Goal 1, PT) standby assist  -EJ     Time Frame (Bed Mobility Goal 1, PT) 1 week  -     Row Name 03/09/23 0959          Transfer Goal 1 (PT)    Activity/Assistive Device (Transfer Goal 1, PT) transfers, all;walker, rolling  -EJ     [place] Level/Cues Needed (Transfer Goal 1, PT) standby assist  -EJ     Time Frame (Transfer Goal 1, PT) 1 week  -EJ     Row Name 03/09/23 0959          Gait Training Goal 1  (PT)    Activity/Assistive Device (Gait Training Goal 1, PT) gait (walking locomotion);walker, rolling  -EJ     [place] Level (Gait Training Goal 1, PT) contact guard required  -EJ     Distance (Gait Training Goal 1, PT) 100  -EJ     Time Frame (Gait Training Goal 1, PT) 1 week  -EJ     Row Name 03/09/23 0959          Therapy Assessment/Plan (PT)    Planned Therapy Interventions (PT) balance training;bed mobility training;gait training;home exercise program;patient/family education;stretching;strengthening;stair training;ROM (range of motion);transfer training  -EJ           User Key  (r) = Recorded By, (t) = Taken By, (c) = Cosigned By    Initials Name Provider Type    EJ Hanna Mccann, PT Physical Therapist               Clinical Impression     Row Name 03/09/23 0968          Pain    Pretreatment Pain Rating 0/10 - no pain  -EJ     Posttreatment Pain Rating 0/10 - no pain  -EJ     Row Name 03/09/23 0995          Plan of Care Review    Plan of Care Reviewed With patient  -EJ     Progress improving  -EJ     Outcome Evaluation Pt seen for PT eval this am. She was admitted to [facility] on 3/6/23 w AMS. Pt w UTI and PNA. At baseline, pt lives alone but has 24/7 caregivers. She uses walker for household ambulation, but also has WC. She does require some assist w ADLs. Pt also w PEG tube for nutrition. Today, pt doing well and oriented x 3. She presents w generalized weakness, decreased activity tolerance, and overall decreased functional mobility. She was able to sit EOB w CGA. She then stood w min A using Rws and took several small steps from bed to chair. She declined attempt to ambulate further today. Pt left up in chair w all needds in reach. Unsure of DC plans at this time. Likely home w caregivers vs SNU. Pt will continue to benefit from skilled PT to maximize safety, strength, and independence w mobility.  -EJ     Row Name 03/09/23 0953          Therapy Assessment/Plan (PT)    Rehab Potential (PT) good, to  achieve stated therapy goals  -EJ     Criteria for Skilled Interventions Met (PT) yes  -EJ     Therapy Frequency (PT) 6 times/wk  -EJ     Row Name 03/09/23 0954          Positioning and Restraints    Pre-Treatment Position in bed  -EJ     Post Treatment Position chair  -EJ     In Chair notified nsg;reclined;call light within reach;encouraged to call for assist;exit alarm on  -EJ           User Key  (r) = Recorded By, (t) = Taken By, (c) = Cosigned By    Initials Name Provider Type    Hanna Chávez PT Physical Therapist               Outcome Measures     Row Name 03/09/23 1000          How much help from another person do you currently need...    Turning from your back to your side while in flat bed without using bedrails? 3  -EJ     Moving from lying on back to sitting on the side of a flat bed without bedrails? 3  -EJ     Moving to and from a bed to a chair (including a wheelchair)? 3  -EJ     Standing up from a chair using your arms (e.g., wheelchair, bedside chair)? 3  -EJ     Climbing 3-5 steps with a railing? 2  -EJ     To walk in hospital room? 3  -EJ     AM-PAC 6 Clicks Score (PT) 17  -EJ     Highest level of mobility 5 --> Static standing  -EJ           User Key  (r) = Recorded By, (t) = Taken By, (c) = Cosigned By    Initials Name Provider Type    Hanna Chávez PT Physical Therapist                             Physical Therapy Education     Title: PT OT SLP Therapies (In Progress)     Topic: Physical Therapy (In Progress)     Point: Mobility training (Done)     Learning Progress Summary           Patient Acceptance, E,TB,D, VU,NR by MILAGROS at 3/9/2023 1000                   Point: Home exercise program (Not Started)     Learner Progress:  Not documented in this visit.          Point: Body mechanics (Not Started)     Learner Progress:  Not documented in this visit.          Point: Precautions (Not Started)     Learner Progress:  Not documented in this visit.                      User Key      Initials Effective Dates Name Provider Type Discipline     06/16/21 -  Hanna Mccann, PT Physical Therapist PT              PT Recommendation and Plan  Planned Therapy Interventions (PT): balance training, bed mobility training, gait training, home exercise program, patient/family education, stretching, strengthening, stair training, ROM (range of motion), transfer training  Plan of Care Reviewed With: patient  Progress: improving  Outcome Evaluation: Pt seen for PT eval this am. She was admitted to [facility] on 3/6/23 w AMS. Pt w UTI and PNA. At baseline, pt lives alone but has 24/7 caregivers. She uses walker for household ambulation, but also has WC. She does require some assist w ADLs. Pt also w PEG tube for nutrition. Today, pt doing well and oriented x 3. She presents w generalized weakness, decreased activity tolerance, and overall decreased functional mobility. She was able to sit EOB w CGA. She then stood w min A using Rws and took several small steps from bed to chair. She declined attempt to ambulate further today. Pt left up in chair w all needds in reach. Unsure of DC plans at this time. Likely home w caregivers vs SNU. Pt will continue to benefit from skilled PT to maximize safety, strength, and independence w mobility.     Time Calculation:    PT Charges     Row Name 03/09/23 1001             Time Calculation    Start Time 0923  -EJ      Stop Time 0940  -EJ      Time Calculation (min) 17 min  -EJ      PT Received On 03/09/23  -EJ      PT - Next Appointment 03/10/23  -EJ      PT Goal Re-Cert Due Date 03/12/23  -EJ         Time Calculation- PT    Total Timed Code Minutes- PT 12 minute(s)  -EJ            User Key  (r) = Recorded By, (t) = Taken By, (c) = Cosigned By    Initials Name Provider Type    EJ Hanna Mccann, PT Physical Therapist              Therapy Charges for Today     Code Description Service Date Service Provider Modifiers Qty    [id number]  PT DAV MOD COMPLEXITY 3 3/9/2023  Hanna Mccann C, PT GP 1    65537523969 HC PT THER PROC EA 15 MIN 3/9/2023 Hanna Mccann C, PT GP 1    59814604758 HC PT THER SUPP EA 15 MIN 3/9/2023 Hanna Mccann, PT GP 1          PT G-Codes  Outcome Measure Options: AM-PAC 6 Clicks Daily Activity (OT)  AM-PAC 6 Clicks Score (PT): 17  AM-PAC 6 Clicks Score (OT): 12  Modified [place] Scale: 5 - Severe disability.  Bedridden, incontinent, and requiring constant nursing care and attention.  PT Discharge Summary  Anticipated Discharge Disposition (PT): skilled nursing facility, home with assist, home with 24/7 care    Hanna Mccann, PT  3/9/2023

## 2023-03-09 NOTE — PROGRESS NOTES
Name: Kiya Cramer ADMIT: 3/6/2023   : 1935  PCP: System, Provider Not In    MRN: [id number] LOS: 1 days   AGE/SEX: 87 y.o. female  ROOM: [facility]   Subjective   Chief Complaint   Patient presents with   • Speech Problem      Cough and dyspnea stable. On room air. No CP. No NVD or abdominal pain. Difficulty sleeping despite home trazodone.    Objective   Vital Signs  Temp:  [95.1 °F (35.1 °C)-97.6 °F (36.4 °C)] 97.6 °F (36.4 °C)  Heart Rate:  [60-88] 88  Resp:  [16-18] 18  BP: (139-155)/(77-88) 139/82  SpO2:  [93 %-100 %] 96 %  on   ;   Device (Oxygen Therapy): room air  Body mass index is 16.43 kg/m².    Physical Exam  Vitals and nursing note reviewed.   Constitutional:       General: She is not in acute distress.     Appearance: She is not diaphoretic.   HENT:      Head: Atraumatic.   Eyes:      General:         Right eye: No discharge.         Left eye: No discharge.      Conjunctiva/sclera: Conjunctivae normal.   Cardiovascular:      Rate and Rhythm: Normal rate and regular rhythm.   Pulmonary:      Effort: Pulmonary effort is normal.      Breath sounds: Rhonchi (left) present. No wheezing.   Abdominal:      General: There is no distension.      Palpations: Abdomen is soft.   Musculoskeletal:         General: No swelling or tenderness.      Cervical back: Neck supple. No tenderness.   Skin:     General: Skin is warm and dry.   Neurological:      Mental Status: She is alert. Mental status is at baseline.      Comments: Slow deliberate speech   Psychiatric:         Mood and Affect: Mood normal.         Behavior: Behavior normal.         Results Review:       I reviewed the patient's new clinical results.      Results from last 7 days   Lab Units 23  0648 23  0623  0623  2351   WBC 10*3/mm3 7.21 5.31 7.82 9.98   HEMOGLOBIN g/dL 12.0 11.5* 11.6* 12.5   PLATELETS 10*3/mm3 206 198 246 230     Results from last 7 days   Lab Units 23  0648 23  0623  03/06/23  2351   SODIUM mmol/L 133* 134* 133* 129*   POTASSIUM mmol/L 4.9 3.2* 3.8 3.7   CHLORIDE mmol/L 98 97* 91* 89*   CO2 mmol/L 28.5 29.0 30.0* 29.0   BUN mg/dL 9 12 20 26*   CREATININE mg/dL 0.36* 0.32* 0.40* 0.64   GLUCOSE mg/dL 113* 89 90 100*   Estimated Creatinine Clearance: 88.4 mL/min (A) (by C-G formula based on SCr of 0.36 mg/dL (L)).  Results from last 7 days   Lab Units 03/09/23  0648 03/08/23  0620 03/07/23  0629 03/06/23  2351   CALCIUM mg/dL 9.5 8.5* 9.0 9.9   ALBUMIN g/dL  --   --  3.1* 3.6   MAGNESIUM mg/dL 2.0  --   --  2.1     Results from last 7 days   Lab Units 03/06/23  2351   INR  0.98   APTT seconds 33.2        aspirin, 81 mg, Oral, Daily  atorvastatin, 80 mg, Oral, Nightly  budesonide, 0.5 mg, Nebulization, BID - RT  cefTRIAXone, 1 g, Intravenous, Q24H  ferrous sulfate, 300 mg, Oral, Q PM  guaifenesin, 400 mg, Oral, Q6H  lansoprazole, 15 mg, Oral, Q AM  levothyroxine, 75 mcg, Oral, Daily  metoprolol tartrate, 50 mg, Oral, BID  mirtazapine, 15 mg, Oral, Nightly  saccharomyces boulardii, 250 mg, Oral, BID  senna, 1 tablet, Oral, Nightly  traZODone, 50 mg, Oral, Nightly      sodium chloride, 75 mL/hr, Last Rate: 75 mL/hr (03/08/23 2248)    NPO Diet NPO Type: Strict NPO    Assessment & Plan      Active Hospital Problems    Diagnosis  POA   • **Altered mental status, unspecified altered mental status type [R41.82]  Yes   • UTI (urinary tract infection) due to urinary indwelling catheter (HCC) [T83.511A, N39.0]  Unknown   • CAP (community acquired pneumonia) [J18.9]  Unknown   • Oropharyngeal dysphagia [R13.12]  Unknown   • Iron deficiency anemia [D50.9]  Unknown   • Paroxysmal atrial fibrillation (HCC) [I48.0]  Yes   • Acquired hypothyroidism [E03.9]  Yes      Resolved Hospital Problems   No resolved problems to display.       · PNA: Infiltrate.  Potential sinusitis with likely aspiration component.  Infiltrate is left sided however. Not hypoxic. RVP ok. Procal not elevated. On  rocephin.  · Bacteriruria: Cx negative. Abx as above.  · Dysphagia: Continue SLP. TF ongoing. Chronic issue per family.  · PAF: Sinus currently. Not on chronic AC since she had a brief single episode of afib previously. Not in afib on ekg this admit.  · JEFF:   Monitoring  · Depression: Continue home regimen.  · Hypothyroidism: Synthroid  · Metabolic Encephalopathy: exacerbation 2/2 aspiration and pna. Improving. No acute stroke on MRI. Appreciate neurology evaluation.  · PPx: SCD  · Disposition: Family and patient considering options. PT/OT evaluated. PMR consult. CCP follow up and can determine options hopefully by this afternoon. I am ok with discharge when arranged.    Neel Guevara MD  [facility]  03/09/23  11:13 EST    Dictated portions using Dragon dictation software.    During the entire encounter, I was wearing recommended PPE including face mask and eye protection. Hand sanitization was performed prior to entering room and upon exit.

## 2023-03-09 NOTE — CONSULTS
[facility]   Consult Note    Patient Name: Kiya Cramer  : 1935  MRN: [id number]  Primary Care Physician:  System, Provider Not In  Referring Physician: Antonio Chamorro MD  Date of admission: 3/6/2023    Inpatient Physical Medicine Rehab Consult  Consult performed by: Olu James MD  Consult ordered by: Neel Guevara MD        Subjective   Subjective     Reason for Consult/ Chief Complaint: Assessment for acute inpatient rehab at [facility]      History of Present Illness  Kiya Cramer is a 87 y.o. female with history of previous inpatient rehab stay at [facility] in .  She has a past history of hypothyroidism, dysphagia requiring PEG tube for nutrition, paroxysmal atrial fibrillation, who presented with slurred speech and decreased activity over the last week.  Also had pulmonary symptoms.  Thick sputum.  She was admitted for pneumonia, urinary tract infection, and started on ceftriaxone.  She was noted to have metabolic encephalopathy and slurred speech now resolved that was felt secondary to her urinary tract infection.  CT of the head without any acute findings.  MRI of the brain did not show any acute findings.    She has history of previous cervical spine fracture causing dysphagia and has been on a PEG tube but does take some pills orally.  She been followed by [facility] gastroenterology.    Additional issues include hypothyroidism, paroxysmal atrial fibrillation with history of very brief one-time episode of atrial fibrillation secondary to psychiatric medication changes.  She was on metoprolol and aspirin and not on anticoagulation as an outpatient.  For depression and appetite she is on trazodone and Remeron.      Functionally, she has 24-hour assistance at home.  Was able to dress herself and complete short household distances at baseline with a rolling walker to the bathroom commode.  Also has a wheelchair.  When she was  discharged from her [facility] rehab last year she was ambulating 200 feet.    She has been oriented x3 in the hospital.  PEG tube in place.  Bed mobility contact-guard.  Standby assist for sitting.  Moderate assist for lower body dressing.  Sit to stand with minimal assist using rolling walker.  Short distance to the chair.  Patient quick to fatigue.  With PT noted to have complaints of generalized weakness, decreased activity tolerance, and overall decreased functional mobility.  Stood with min assist using rolling walker took several small steps from the bed to the chair.  She declined attempt to ambulate further with PT.    Review of Systems   Denies headache.  Has some cough.  Denies any shortness of air.  Denies any abdominal complaints.  Personal History     Past Medical History:   Diagnosis Date   • Acquired hypothyroidism 7/8/2015   • Health care maintenance    • Heart murmur    • Hypertension    • Mitral regurgitation     Trace to mild per 2-D echocardiogram 3/25/2013   • Mitral valve prolapse     Mild per 2-D echocardiogram 12/30/2016-trace mitral valve regurgitation   • Palpitations 12/14/2016    Description: had a prior holter with 35 sec of AF; stopped dilt and pradaxa in 3/13; added metoprolol; more palp and will get event recorder; event recorder was normal   • Paroxysmal atrial fibrillation (HCC)     History of 35 second run of paroxysmal atrial fibrillation per Holter; The patient previously was on diltiazem and this was discontinued due to adverse effects; she was on Pradaxa but has an unsteady gait and this was discontinued; she has remained on metoprolol tartrate   • Right bundle branch block     Chronic   • 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 Otherwise pertinent FHx was reviewed and not pertinent to current issue.    Social History:  reports that she has never smoked. She has never used smokeless tobacco. She reports that she does not drink alcohol and does not use drugs.  Lives alone with 24/7 caregivers  Home Medications:   Latanoprostene Bunod, Suvorexant, albuterol, aspirin, budesonide, levothyroxine, metoprolol tartrate, mirtazapine, and traZODone    Allergies:  Allergies   Allergen Reactions   • Cardizem [Diltiazem Hcl]      Scheduled Meds:aspirin, 81 mg, Oral, Daily  atorvastatin, 80 mg, Oral, Nightly  budesonide, 0.5 mg, Nebulization, BID - RT  cefTRIAXone, 1 g, Intravenous, Q24H  ferrous sulfate, 300 mg, Oral, Q PM  guaifenesin, 400 mg, Oral, Q6H  lansoprazole, 15 mg, Oral, Q AM  levothyroxine, 75 mcg, Oral, Daily  metoprolol tartrate, 50 mg, Oral, BID  mirtazapine, 15 mg, Oral, Nightly  saccharomyces boulardii, 250 mg, Oral, BID  senna, 1 tablet, Oral, Nightly  traZODone, 50 mg, Oral, Nightly      Continuous Infusions:   PRN Meds:.•  acetaminophen **OR** acetaminophen  •  albuterol  •  bisacodyl  •  magnesium sulfate **OR** magnesium sulfate in D5W 1g/100mL (PREMIX) **OR** magnesium sulfate  •  melatonin  •  ondansetron  •  potassium chloride **OR** potassium chloride **OR** potassium chloride  •  [COMPLETED] Insert Peripheral IV **AND** sodium chloride    Objective    Objective     Vitals:  Temp:  [96.3 °F (35.7 °C)-97.6 °F (36.4 °C)] 96.9 °F (36.1 °C)  Heart Rate:  [76-88] 76  Resp:  [16-18] 16  BP: (139-158)/(77-89) 158/89    Physical Exam      MENTAL STATUS -  AWAKE / ALERT  HEENT- NCAT,  SCLERAE ANICTERIC, CONJUNCTIVAE PINK, OP MOIST, NO JVD, EARS UNREMARKABLE EXTERNALLY  LUNGS - CTA, NO  WHEEZES, RALES OR RHONCHI  HEART- RRR, NO RUB, MURMUR, OR GALLOP  ABD - NORMOACTIVE BOWEL SOUNDS, SOFT, NT. NO HEPATOSPLENOMEGALY APPRECIATED  PEG tube site unremarkable  EXT - NO EDEMA OR CYANOSIS  NEURO -awake alert and oriented to person place and time.  However confusion with some confused comments, decreased attention.  She also has hallucinations thinking there is someone in the closet in the room.  Also thinks there is another door next to the actual door of the closet.  MOTOR EXAM -takes resistance in the bilateral upper extremities and lower extremities    Result Review    Result Review:  Results from last 7 days   Lab Units 03/09/23  0648 03/08/23  0620 03/07/23  0629   WBC 10*3/mm3 7.21 5.31 7.82   HEMOGLOBIN g/dL 12.0 11.5* 11.6*   HEMATOCRIT % 36.4 34.5 35.3   PLATELETS 10*3/mm3 206 198 246     Results from last 7 days   Lab Units 03/09/23  0648 03/08/23  0620 03/07/23  0629 03/06/23  2351   SODIUM mmol/L 133* 134* 133* 129*   POTASSIUM mmol/L 4.9 3.2* 3.8 3.7   CHLORIDE mmol/L 98 97* 91* 89*   CO2 mmol/L 28.5 29.0 30.0* 29.0   BUN mg/dL 9 12 20 26*   CREATININE mg/dL 0.36* 0.32* 0.40* 0.64   CALCIUM mg/dL 9.5 8.5* 9.0 9.9   BILIRUBIN mg/dL  --   --  0.2 0.3   ALK PHOS U/L  --   --  132* 149*   ALT (SGPT) U/L  --   --  8 14   AST (SGOT) U/L  --   --  14 15   GLUCOSE mg/dL 113* 89 90 100*         Assessment & Plan   Assessment / Plan           Active Hospital Problems:  Active Hospital Problems    Diagnosis    • **Altered mental status, unspecified altered mental status type    • UTI (urinary tract infection) due to urinary indwelling catheter (HCC)    • CAP (community acquired pneumonia)    • Oropharyngeal dysphagia    • Iron deficiency anemia    • Paroxysmal atrial fibrillation (HCC)    • Acquired hypothyroidism      Pneumonia-infiltrate-potential sinusitis with likely aspiration component.  Left-sided infiltrate.-Ceftriaxone  Bacteriuria-cultures negative-on ceftriaxone  Metabolic encephalopathy  secondary to above--persist with hallucinations  Paroxysmal atrial fibrillation-sinus rhythm currently.  Depression-appetite stimulant-trazodone/Remeron  Chronic dysphagia-chronic PEG tube feeds  Impaired mobility  Impaired self-care  Impaired cognition    Functionally, she has 24-hour assistance at home.  Was able to dress herself and complete short household distances at baseline with a rolling walker to the bathroom commode.  Also has a wheelchair.  [facility] ambulating 200 feet.    She has been oriented x3 in the hospital.  However she continues with confusion and is having some hallucinations presently thinking someone in her closet in the room.  Also thinks there is a door next to the actual door of the closet.  PEG tube in place.  Bed mobility contact-guard.  Standby assist for sitting.  Moderate assist for lower body dressing.  Sit to stand with minimal assist using rolling walker.  Short distance to the chair.  Patient quick to fatigue.  With PT noted to have complaints of generalized weakness, decreased activity tolerance, and overall decreased functional mobility.  Stood with min assist using rolling walker took several small steps from the bed to the chair.  She declined attempt to ambulate further with PT.    Given her baseline functional level and current functional status and medical issues, I would recommend acute inpatient rehab at [facility].  This would be a comprehensive interdisciplinary program with ongoing physician follow-up/management to maximize her functional recovery.  Rehabilitation nursing for monitoring of her pulmonary status and neurologic status, nutritional status, bowel bladder and skin.  Speech therapy for cognition and swallowing.  Occupational Therapy for ADL performance.  Physical therapy for strengthening balance and ambulation.  3 hours of therapy a day.  Goal will be for eventual return home with improved  functional level with her caregivers and hopefully lessen the potential for recurrent hospitalization.  Patient reviewed with discharge planning        Olu James MD    During rounds, used appropriate personal protective equipment including mask and gloves.  Additional gown if indicated.  Mask used was standard procedure mask. Appropriate PPE was worn during the entire visit.  Hand hygiene was completed before and after.

## 2023-03-09 NOTE — PLAN OF CARE
Goal Outcome Evaluation:  Plan of Care Reviewed With: patient        Progress: improving  Outcome Evaluation: Pt seen for PT eval this am. She was admitted to [facility] on 3/6/23 w AMS. Pt w UTI and PNA. At baseline, pt lives alone but has 24/7 caregivers. She uses walker for household ambulation, but also has WC. She does require some assist w ADLs. Pt also w PEG tube for nutrition. Today, pt doing well and oriented x 3. She presents w generalized weakness, decreased activity tolerance, and overall decreased functional mobility. She was able to sit EOB w CGA. She then stood w min A using Rws and took several small steps from bed to chair. She declined attempt to ambulate further today. Pt left up in chair w all needds in reach. Unsure of DC plans at this time. Likely home w caregivers vs SNU. Pt will continue to benefit from skilled PT to maximize safety, strength, and independence w mobility.

## 2023-03-09 NOTE — PLAN OF CARE
Goal Outcome Evaluation:  Plan of Care Reviewed With: patient        Progress: no change  Outcome Evaluation: Pt seen this AM for OT tx session, pt's caregiver present at beginning of session, pt reports she has 24/7 assist at home, was able to dress hereslf and complete short household distances at baseline with Rwx to BR commode, etc. Pt presents today A&Ox3, PEG in place. Completing bed mob with CGA, SBA for sitting, mod A for LBD. Pt completed STS with  min A using RWx, short distance to chair, pt quick to fatigue and declining further participation with therapy due to fatigue. Pt will continue to benefit from skilled OT to address deficits and progress toward goals, d/c plan pending, home with 24/7 caregiver assist vs SNF.

## 2023-03-09 NOTE — CONSULTS
[facility] consulted regarding confusion.  Patient evaluated in room [facility].  She is accompanied by caretaker from [facility].  Patient is awake, RIB; she is pleasant and cooperative.  She is alert and oriented to self, year, situation, and knows we're in a hospital but cannot recall the name.  Affect a bit flat but she does smile with conversation.  She is able to answer questions appropriately but is a bit of a poor historian.  She gave permission to speak with her daughter via phone.        She is an 86 yo W/W/F, lives alone with 24-hour caregivers.  She is retired, worked as a teacher and homemaker.  She has two adult children, her daughter, Gabby, lives locally and is supportive; her son lives in [place].  Her  is  secondary AD.  She reports feeling safe at home and denies any history of trauma, abuse, or violence.    She reports coming to the hospital related to having a UTI and pneumonia.  She reports she was having visual hallucinations as well; seeing pink dots and round things in her field of vision along with seeing puppies and cats.  With most recent being last night.  She denies any current visual hallucinations and she does not appear to be responding to any internal stimuli.  She reports similar episode of hallucinations while having a UTI last year.  She denies any other previous history of hallucinations and reports only has them when she has a UTI.  She denies history of significant mental health issues.  She denies dementia and family history of dementia.  She denies history of IP psych admissions.      She denies past/current SI, denies HI.  Per her daughter, patient followed with a psychiatrist several years ago after the death of her  as she was experiencing some anxiety and depression.  Patient and daughter report history of sleeping issues.  Patient reports only able to get 1-3 hours of sleep at night.  She is on Remeron and recently started back on  trazodone, which has been beneficial. She denies current anxiety and depression.  She denies tobacco, alcohol, and illicit drug use.      Daughter reports patient gets frequent UTIs and will experience hallucinations as a result.  She would like a urologist consult, RN informed.  Daughter also interested in having outpatient mental health resources in case needed at a later time, which were provided.  Daughter has no other concerns related to mental health at this time.     Access will sign off; call if any questions.

## 2023-03-09 NOTE — PLAN OF CARE
Problem: Adult Inpatient Plan of Care  Goal: Plan of Care Review  Outcome: Ongoing, Progressing  Flowsheets (Taken 3/9/2023 0408)  Plan of Care Reviewed With: patient     Problem: Aspiration (Enteral Nutrition)  Goal: Absence of Aspiration Signs and Symptoms  Outcome: Ongoing, Progressing  Intervention: Minimize Aspiration Risk  Recent Flowsheet Documentation  Taken 3/9/2023 0400 by Claudia Davidson RN  Head of Bed (HOB) Positioning: HOB at 30 degrees  Taken 3/9/2023 0200 by Claudia Davidson RN  Head of Bed (HOB) Positioning: HOB at 30 degrees  Taken 3/9/2023 0000 by Claudia Davidson RN  Head of Bed (HOB) Positioning: HOB at 30 degrees     Problem: Device-Related Complication Risk (Enteral Nutrition)  Goal: Safe, Effective Therapy Delivery  Outcome: Ongoing, Progressing     Problem: Feeding Intolerance (Enteral Nutrition)  Goal: Feeding Tolerance  Outcome: Ongoing, Progressing     Problem: Skin Injury Risk Increased  Goal: Skin Health and Integrity  Outcome: Ongoing, Progressing  Intervention: Optimize Skin Protection  Recent Flowsheet Documentation  Taken 3/9/2023 0400 by Claudia Davidson RN  Head of Bed (HOB) Positioning: HOB at 30 degrees  Taken 3/9/2023 0200 by Claudia Davidson RN  Head of Bed (HOB) Positioning: HOB at 30 degrees  Taken 3/9/2023 0000 by Claudia Davidson RN  Pressure Reduction Techniques: frequent weight shift encouraged  Head of Bed (HOB) Positioning: HOB at 30 degrees  Pressure Reduction Devices: alternating pressure pump (ADD)  Skin Protection: adhesive use limited     Problem: Fall Injury Risk  Goal: Absence of Fall and Fall-Related Injury  Outcome: Ongoing, Progressing  Intervention: Identify and Manage Contributors  Recent Flowsheet Documentation  Taken 3/9/2023 0400 by Claudia Davidson RN  Medication Review/Management: medications reviewed  Taken 3/9/2023 0200 by Claudia Davidson RN  Medication Review/Management: medications reviewed  Taken 3/9/2023 0000 by Claudia Davidson RN  Medication  Review/Management: medications reviewed  Intervention: Promote Injury-Free Environment  Recent Flowsheet Documentation  Taken 3/9/2023 0400 by Claudia Davidson, RN  Safety Promotion/Fall Prevention: safety round/check completed  Taken 3/9/2023 0200 by Claudia Davidson, RN  Safety Promotion/Fall Prevention: safety round/check completed  Taken 3/9/2023 0000 by Claudia Davidson, RN  Safety Promotion/Fall Prevention: safety round/check completed   Goal Outcome Evaluation:  Plan of Care Reviewed With: patient

## 2023-03-09 NOTE — CONSULTS
Nutrition Services    Patient Name:  Kiya Cramer  YOB: 1935  MRN: [id number]  Admit Date:  3/6/2023  Assessment Date:  03/09/23    CLINICAL NUTRITION ASSESSMENT    Comments: Consult for tube feeding assessment  Visited patient at bedside who is s/p VFSS and noted to have severe oropharyngeal dysphagia. Tube feeds observed to be infusing at goal rate. NFPE completed and c/w severe malnutrition. She reports she is feeling ok and ambulated for the first time in 3 days. She reports some weakness, but she states it's because she has not gotten out of bed in 3 days. Will continue to follow       Recommendations:   1. Continue tube feeds at goal rate    Patient meets ASPEN/AND criteria for nutrition dx of severe malnutrition of chronic disease based on muscle and fat loss    RD to follow tolerance, labs and clinical course.     Encounter Information         Reason for Encounter Consult for TF assessment       Admitting Diagnosis Altered mental status       Pertinent Medical History HTN, PAF, recent admission to [place] for encephalopathy related to UTI, dysphagia requiring PEG tube for nutrition      Current Issues 87yoM with PEG related to dysphagia presents to ED with c/o slurred speech and decreased activity over past week. CT head and MRI brain both negative for stroke per neurology. Pt with suspected UTI, urine culture pending. XR chest suspicious for pneumonia but RVP negative. Swallow eval completed today with recs for pt to receive nutrition via PEG and free water protocol.      Current Nutrition Orders & Evaluation of Intake       Oral Nutrition     Food Allergies NFKA   Current PO Diet NPO Diet NPO Type: Strict NPO   Supplement    PO Evaluation      % PO Intake    --   Enteral Nutrition     Enteral Route PEG    TF Delivery Method Continuous    Enteral Product Isosource 1.5    Modular     Propofol Rate/Kcal     TF Current Rate (mL) 45    TF Goal Rate (mL) 45    Current Water Flush (mL) 35ml q4  "hours     Current TF Provision  1485 kcal, 67 gm protein, 752 mL free water + 210 mL water flushes         Calories 100% needs met         Protein  100% needs met         TF Fluid (mL) 962         IV Fluids     Avg Volume Delivered (mL)     % Goal Volume     TF Residual  no or minimal residual    TF Changes none    TF Tolerance tolerating     Anthropometrics          Height    Weight Height: 172.7 cm (68\")  Weight: 49 kg (108 lb 0.4 oz) (03/09/23 0100)    BMI kg/m2 Body mass index is 16.43 kg/m².    Weight History  Wt Readings from Last 15 Encounters:   03/09/23 0100 49 kg (108 lb 0.4 oz)   03/08/23 1358 49 kg (108 lb)   03/06/23 2324 49.4 kg (108 lb 14.2 oz)   12/19/18 1426 56.7 kg (125 lb)   12/18/17 1302 52 kg (114 lb 9.6 oz)   12/30/16 1308 49 kg (108 lb)   12/14/16 1411 49 kg (108 lb)   12/15/15 1220 49.7 kg (109 lb 9.5 oz)   04/16/15 1022 49 kg (108 lb 0.1 oz)   10/27/14 1103 49.9 kg (110 lb 0.2 oz)   05/13/13 1032 48.5 kg (107 lb 0.2 oz)        Estimated/Assessed Needs        Energy Requirements    Height for Calculation  Height: 172.7 cm (68\")   Weight for Calculation Admit weight (49.4 kg)    Method for Estimation  30 kcal/kg   EST Needs (kcal/day) 1482 kcal        Protein Requirements    Weight for Calculation 49.4 kg    EST Protein Needs (g/kg) 1.2 - 1.5 gm/kg   EST Daily Needs (g/day) 59-74 gm        Fluid Requirements     Method for Estimation 1 mL/kcal    Estimated Needs (mL/day)          Physical Findings          Physical Appearance alert, flat affect, oriented, room air, underweight   Oral/Mouth Cavity WNL   Edema  no edema   Gastrointestinal normoactive   Skin  skin intact   Tubes/Drains PEG   NFPE Consented to exam, See Malnutrition Severity Assessment      Malnutrition Severity Assessment      Patient meets criteria for : Severe Malnutrition (Patient meets ASPEN/AND criteria for nutrition dx of severe malnutrition of chronic disease based on muscle and fat loss)  Malnutrition Type (last 8 hours)  "    Malnutrition Severity Assessment     Row Name 03/09/23 1220       Malnutrition Severity Assessment    Malnutrition Type Chronic Disease - Related Malnutrition    Row Name 03/09/23 1220       Muscle Loss    Loss of Muscle Mass Findings Severe    [place] Region Severe - deep hollowing/scooping, lack of muscle to touch, facial bones well defined    Clavicle Bone Region Severe - protruding prominent bone    Acromion Bone Region Severe - squared shoulders, bones, and acromion process protrusion prominent    Dorsal Hand Region Severe - prominent depression    Patellar Region Severe - prominent bone, square looking, very little muscle definition    Anterior Thigh Region Severe - line/depression along thigh, obviously thin    Posterior Calf Region Severe - thin with very little definition/firmness    Row Name 03/09/23 1220       Fat Loss    Subcutaneous Fat Loss Findings Severe    Orbital Region  Severe - pronounced hollowness/depression, dark circles, loose saggy skin    Upper Arm Region Severe - mostly skin, very little space between folds, fingers touch    Row Name 03/09/23 1220       Criteria Met (Must meet criteria for severity in at least 2 of these categories: M Wasting, Fat Loss, Fluid, Secondary Signs, Wt. Status, Intake)    Patient meets criteria for  Severe Malnutrition  Patient meets ASPEN/AND criteria for nutrition dx of severe malnutrition of chronic disease based on muscle and fat loss                   Labs        Pertinent Labs Reviewed, listed below     Results from last 7 days   Lab Units 03/09/23  0648 03/08/23  0620 03/07/23  0629 03/06/23  2351   SODIUM mmol/L 133* 134* 133* 129*   POTASSIUM mmol/L 4.9 3.2* 3.8 3.7   CHLORIDE mmol/L 98 97* 91* 89*   CO2 mmol/L 28.5 29.0 30.0* 29.0   BUN mg/dL 9 12 20 26*   CREATININE mg/dL 0.36* 0.32* 0.40* 0.64   CALCIUM mg/dL 9.5 8.5* 9.0 9.9   BILIRUBIN mg/dL  --   --  0.2 0.3   ALK PHOS U/L  --   --  132* 149*   ALT (SGPT) U/L  --   --  8 14   AST (SGOT) U/L  --    --  14 15   GLUCOSE mg/dL 113* 89 90 100*     Results from last 7 days   Lab Units 03/09/23  0648 03/08/23  0620 03/07/23 0629 03/06/23  2351   MAGNESIUM mg/dL 2.0  --   --  2.1   HEMOGLOBIN g/dL 12.0   < > 11.6* 12.5   HEMATOCRIT % 36.4   < > 35.3 36.3   WBC 10*3/mm3 7.21   < > 7.82 9.98   TRIGLYCERIDES mg/dL  --   --  69  --    ALBUMIN g/dL  --   --  3.1* 3.6    < > = values in this interval not displayed.     Results from last 7 days   Lab Units 03/09/23  0648 03/08/23 0620 03/07/23 0629 03/06/23  2351   INR   --   --   --  0.98   APTT seconds  --   --   --  33.2   PLATELETS 10*3/mm3 206 198 246 230     COVID19   Date Value Ref Range Status   03/07/2023 Not Detected Not Detected - Ref. Range Final     Lab Results   Component Value Date    HGBA1C 5.00 03/07/2023          Medications            Scheduled Medications aspirin, 81 mg, Oral, Daily  atorvastatin, 80 mg, Oral, Nightly  budesonide, 0.5 mg, Nebulization, BID - RT  cefTRIAXone, 1 g, Intravenous, Q24H  ferrous sulfate, 300 mg, Oral, Q PM  guaifenesin, 400 mg, Oral, Q6H  lansoprazole, 15 mg, Oral, Q AM  levothyroxine, 75 mcg, Oral, Daily  metoprolol tartrate, 50 mg, Oral, BID  mirtazapine, 15 mg, Oral, Nightly  saccharomyces boulardii, 250 mg, Oral, BID  senna, 1 tablet, Oral, Nightly  traZODone, 50 mg, Oral, Nightly        Infusions      PRN Medications •  acetaminophen **OR** acetaminophen  •  albuterol  •  bisacodyl  •  magnesium sulfate **OR** magnesium sulfate in D5W 1g/100mL (PREMIX) **OR** magnesium sulfate  •  melatonin  •  ondansetron  •  potassium chloride **OR** potassium chloride **OR** potassium chloride  •  [COMPLETED] Insert Peripheral IV **AND** sodium chloride     PES STATEMENT / NUTRITION DIAGNOSIS      Nutrition Dx Problem  Problem: Needs Alternative Route  Etiology: Functional Diagnosis and Factors Affecting Nutrition - dysphagia  Signs/Symptoms: NPO, PO Diet Not Tolerated, Report/Observation and SLP/Swallow Evaluation    Comment:       PLAN OF CARE  Intervention Goal        Intervention Goal(s) Nutrition support treatment, Meet estimated needs, Disease management/therapy, Initiate TF/PN, Tolerate TF/PN at goal and No significant weight loss     Nutrition Intervention         RD Action Await initiation of EN/PN, Follow Tx Progress, Care plan reviewed and Recommend/ordered:      Nutrition Prescription          Recommendation       Enteral Prescription:     Enteral Route PEG    TF Delivery Method Continuous    Enteral Product Isosource 1.5    Modular     Propofol Rate/Kcal     TF Start Rate (mL/hr) 15 mL/hr     TF Goal Rate (mL/hr) 45 mL/hr    Free Water Flush (mL) 35 mL FW q 4 hr     TF Provision at Goal: 1485 kcal, 67 gm protein, 752 mL free water + 210 mL water flushes         Calories 100 % needs met         Protein  100 % needs met         Fluid (mL) 962 mL total     Prescription Ordered Yes     Monitor/Evaluation        Monitor Per protocol     RD to follow up per protocol.    Electronically signed by:  Silvia Clements RD  03/09/23 14:54 EST

## 2023-03-10 VITALS
HEIGHT: 68 IN | TEMPERATURE: 97.6 F | OXYGEN SATURATION: 95 % | RESPIRATION RATE: 18 BRPM | BODY MASS INDEX: 16.37 KG/M2 | HEART RATE: 96 BPM | SYSTOLIC BLOOD PRESSURE: 147 MMHG | DIASTOLIC BLOOD PRESSURE: 95 MMHG | WEIGHT: 108.03 LBS

## 2023-03-10 LAB
ANION GAP SERPL CALCULATED.3IONS-SCNC: 9.4 MMOL/L (ref 5–15)
BUN SERPL-MCNC: 11 MG/DL (ref 8–23)
BUN/CREAT SERPL: 34.4 (ref 7–25)
CALCIUM SPEC-SCNC: 10 MG/DL (ref 8.6–10.5)
CHLORIDE SERPL-SCNC: 94 MMOL/L (ref 98–107)
CO2 SERPL-SCNC: 26.6 MMOL/L (ref 22–29)
CREAT SERPL-MCNC: 0.32 MG/DL (ref 0.57–1)
DEPRECATED RDW RBC AUTO: 45.3 FL (ref 37–54)
EGFRCR SERPLBLD CKD-EPI 2021: 102.5 ML/MIN/1.73
ERYTHROCYTE [DISTWIDTH] IN BLOOD BY AUTOMATED COUNT: 13.4 % (ref 12.3–15.4)
GLUCOSE BLDC GLUCOMTR-MCNC: 112 MG/DL (ref 70–130)
GLUCOSE BLDC GLUCOMTR-MCNC: 113 MG/DL (ref 70–130)
GLUCOSE BLDC GLUCOMTR-MCNC: 99 MG/DL (ref 70–130)
GLUCOSE SERPL-MCNC: 94 MG/DL (ref 65–99)
HCT VFR BLD AUTO: 36.2 % (ref 34–46.6)
HGB BLD-MCNC: 11.9 G/DL (ref 12–15.9)
MCH RBC QN AUTO: 30.5 PG (ref 26.6–33)
MCHC RBC AUTO-ENTMCNC: 32.9 G/DL (ref 31.5–35.7)
MCV RBC AUTO: 92.8 FL (ref 79–97)
PLATELET # BLD AUTO: 246 10*3/MM3 (ref 140–450)
PMV BLD AUTO: 9.4 FL (ref 6–12)
POTASSIUM SERPL-SCNC: 4.8 MMOL/L (ref 3.5–5.2)
RBC # BLD AUTO: 3.9 10*6/MM3 (ref 3.77–5.28)
SODIUM SERPL-SCNC: 130 MMOL/L (ref 136–145)
WBC NRBC COR # BLD: 8.65 10*3/MM3 (ref 3.4–10.8)

## 2023-03-10 PROCEDURE — 82962 GLUCOSE BLOOD TEST: CPT

## 2023-03-10 PROCEDURE — 94799 UNLISTED PULMONARY SVC/PX: CPT

## 2023-03-10 PROCEDURE — 94664 DEMO&/EVAL PT USE INHALER: CPT

## 2023-03-10 PROCEDURE — 85027 COMPLETE CBC AUTOMATED: CPT | Performed by: INTERNAL MEDICINE

## 2023-03-10 PROCEDURE — 94761 N-INVAS EAR/PLS OXIMETRY MLT: CPT

## 2023-03-10 PROCEDURE — 80048 BASIC METABOLIC PNL TOTAL CA: CPT | Performed by: INTERNAL MEDICINE

## 2023-03-10 RX ORDER — BISACODYL 10 MG
10 SUPPOSITORY, RECTAL RECTAL DAILY PRN
Start: 2023-03-10

## 2023-03-10 RX ORDER — AMOXICILLIN AND CLAVULANATE POTASSIUM 875; 125 MG/1; MG/1
1 TABLET, FILM COATED ORAL 2 TIMES DAILY
Qty: 4 TABLET | Refills: 0
Start: 2023-03-10 | End: 2023-03-12

## 2023-03-10 RX ORDER — ATORVASTATIN CALCIUM 80 MG/1
80 TABLET, FILM COATED ORAL NIGHTLY
Qty: 90 TABLET
Start: 2023-03-10

## 2023-03-10 RX ORDER — SENNA PLUS 8.6 MG/1
1 TABLET ORAL NIGHTLY
Start: 2023-03-10

## 2023-03-10 RX ORDER — FERROUS SULFATE 300 MG/5ML
300 LIQUID (ML) ORAL EVERY EVENING
Start: 2023-03-10

## 2023-03-10 RX ORDER — GUAIFENESIN 200 MG/10ML
400 LIQUID ORAL 3 TIMES DAILY PRN
Start: 2023-03-10

## 2023-03-10 RX ADMIN — GUAIFENESIN 400 MG: 200 SOLUTION ORAL at 05:18

## 2023-03-10 RX ADMIN — METOPROLOL TARTRATE 50 MG: 50 TABLET, FILM COATED ORAL at 09:45

## 2023-03-10 RX ADMIN — BUDESONIDE 0.5 MG: 0.5 INHALANT ORAL at 08:24

## 2023-03-10 RX ADMIN — ASPIRIN 81 MG: 81 TABLET, COATED ORAL at 09:45

## 2023-03-10 RX ADMIN — LANSOPRAZOLE 15 MG: 15 TABLET, ORALLY DISINTEGRATING, DELAYED RELEASE ORAL at 05:18

## 2023-03-10 RX ADMIN — GUAIFENESIN 400 MG: 200 SOLUTION ORAL at 16:24

## 2023-03-10 RX ADMIN — Medication 250 MG: at 09:45

## 2023-03-10 RX ADMIN — LEVOTHYROXINE SODIUM 75 MCG: 0.07 TABLET ORAL at 09:45

## 2023-03-10 RX ADMIN — GUAIFENESIN 400 MG: 200 SOLUTION ORAL at 09:45

## 2023-03-10 RX ADMIN — Medication 300 MG: at 16:24

## 2023-03-10 NOTE — PROGRESS NOTES
Case Management Discharge Note      Final Note: Pt's daughter prefers [facility] acute rehab. Per Lay, bed available today after 4:00 PM, no new covid swab or prescriptions needed. Packet on pt chart. [facility] scheduled at 5:00 PM. Daughter updated with transport time. Awa Garber LCSW    Provided Post Acute Provider List?: N/A  N/A Provider List Comment: Is current with [facility] .  Daughter wants referrals to [facility]. Juan J    Selected Continued Care - Admitted Since 3/6/2023     Destination Coordination complete.    Service Provider Selected Services Address Phone Fax Patient Preferred    [facility] [address] [id number] [phone] --          Durable Medical Equipment    No services have been selected for the patient.              Dialysis/Infusion    No services have been selected for the patient.              Home Medical Care    No services have been selected for the patient.              Therapy    No services have been selected for the patient.              Community Resources    No services have been selected for the patient.              Community & DME    No services have been selected for the patient.                  Transportation Services  Ambulance: [facility]    Final Discharge Disposition Code: 62 - inpatient rehab facility

## 2023-03-10 NOTE — PROGRESS NOTES
[facility]    Noted plans to discharge to [facility].  Will sign off.    Thank you,  Gabby Cunningham RN  Rehab Admission Nurse

## 2023-03-10 NOTE — PLAN OF CARE
Problem: Adult Inpatient Plan of Care  Goal: Plan of Care Review  Outcome: Ongoing, Progressing  Flowsheets (Taken 3/10/2023 0226)  Plan of Care Reviewed With: patient     Problem: Aspiration (Enteral Nutrition)  Goal: Absence of Aspiration Signs and Symptoms  Outcome: Ongoing, Progressing  Intervention: Minimize Aspiration Risk  Recent Flowsheet Documentation  Taken 3/10/2023 0200 by Claudia Davidson RN  Head of Bed (\[facility]\"") Positioning: HOB at 30 degrees  Taken 3/10/2023 0000 by Claudia Davidson RN  Head of Bed (HOB) Positioning: HOB at 30 degrees  Taken 3/9/2023 2200 by Claudia Davidson RN  Head of Bed (\[facility]\"") Positioning: HOB at 30 degrees  Taken 3/9/2023 2000 by Claudia Davidson RN  Head of Bed (\[facility]\"") Positioning: HOB at 30 degrees     Problem: Device-Related Complication Risk (Enteral Nutrition)  Goal: Safe, Effective Therapy Delivery  Outcome: Ongoing, Progressing     Problem: Feeding Intolerance (Enteral Nutrition)  Goal: Feeding Tolerance  Outcome: Ongoing, Progressing     Problem: Skin Injury Risk Increased  Goal: Skin Health and Integrity  Outcome: Ongoing, Progressing  Intervention: Optimize Skin Protection  Recent Flowsheet Documentation  Taken 3/10/2023 0200 by Claudia Davidson RN  Head of Bed (\[facility]\"") Positioning: HOB at 30 degrees  Taken 3/10/2023 0000 by Claudia Davidson RN  Head of Bed (\[facility]\"") Positioning: HOB at 30 degrees  Taken 3/9/2023 2200 by Claudia Davidson RN  Head of Bed (\[facility]\"") Positioning: HOB at 30 degrees  Taken 3/9/2023 2000 by Claudia Davidson RN  Pressure Reduction Techniques: frequent weight shift encouraged  Head of Bed (HOB) Positioning: HOB at 30 degrees  Pressure Reduction Devices: alternating pressure pump (ADD)  Skin Protection: adhesive use limited     Problem: Fall Injury Risk  Goal: Absence of Fall and Fall-Related Injury  Outcome: Ongoing, Progressing  Intervention: Identify and Manage Contributors  Recent Flowsheet Documentation  Taken 3/10/2023 0200 by Claudia Davidson RN  Medication  Review/Management: medications reviewed  Taken 3/10/2023 0000 by Claudia Davidson RN  Medication Review/Management: medications reviewed  Taken 3/9/2023 2200 by Claudia Davidson RN  Medication Review/Management: medications reviewed  Taken 3/9/2023 2000 by Claudia Davidson RN  Medication Review/Management: medications reviewed  Intervention: Promote Injury-Free Environment  Recent Flowsheet Documentation  Taken 3/10/2023 0200 by Claudia Davidson RN  Safety Promotion/Fall Prevention: safety round/check completed  Taken 3/10/2023 0000 by Claudia Davidson RN  Safety Promotion/Fall Prevention: safety round/check completed  Taken 3/9/2023 2200 by Claudia Davidson RN  Safety Promotion/Fall Prevention: safety round/check completed  Taken 3/9/2023 2000 by Claudia Davidson RN  Safety Promotion/Fall Prevention: safety round/check completed   Goal Outcome Evaluation:  Plan of Care Reviewed With: patient

## 2023-03-10 NOTE — PROGRESS NOTES
"Physicians Statement of Medical Necessity for  Ambulance Transportation    GENERAL INFORMATION     Name: Kiya Cramer  YOB: 1935  Medicare #: [id number]  Transport Date: 3/10/23 (Valid for round trips this date, or for scheduled repetitive trips for 60 days from the date signed below.)  Origin: [facility]  Destination: [facility] ()  Is the Patient's stay covered under Medicare Part A (PPS/DRG?)Yes  Closest appropriate facility? Yes  If this a hosp-hosp transfer? No  Is this a hospice patient? No    MEDICAL NECESSITY QUESTIONAIRE    Ambulance Transportation is medically necessary only if other means of transportation are contraindicated or would be potentially harmful to the patient.  To meet this requirement, the patient must be either \"bed confined\" or suffer from a condition such that transport by means other than an ambulance is contraindicated by the patient's condition.  The following questions must be answered by the healthcare professional signing below for this form to be valid:     1) Describe the MEDICAL CONDITION (physical and/or mental) of this patient AT THE TIME OF AMBULANCE TRANSPORT that requires the patient to be transported in an ambulance, and why transport by other means is contraindicated by the patient's condition: immobility, falls risk  Past Medical History:   Diagnosis Date   • Acquired hypothyroidism 7/8/2015   • Health care maintenance    • Heart murmur    • Hypertension    • Mitral regurgitation     Trace to mild per 2-D echocardiogram 3/25/2013   • Mitral valve prolapse     Mild per 2-D echocardiogram 12/30/2016-trace mitral valve regurgitation   • Palpitations 12/14/2016    Description: had a prior holter with 35 sec of AF; stopped dilt and pradaxa in 3/13; added metoprolol; more palp and will get event recorder; event recorder was normal   • Paroxysmal atrial fibrillation (HCC)     History of 35 second run of paroxysmal atrial fibrillation per " "Holter; The patient previously was on diltiazem and this was discontinued due to adverse effects; she was on Pradaxa but has an unsteady gait and this was discontinued; she has remained on metoprolol tartrate   • Right bundle branch block     Chronic   • 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  3) Can this patient safely be transported by car or wheelchair van (I.e., may safely sit during transport, without an attendant or monitoring?)No   4. In addition to completing questions 1-3 above, please check any of the following conditions that apply*:          *Note: supporting documentation for any boxes checked must be maintained in the patient's medical records Patient is confused and Unable to tolerate seated position for time needed to transport      SIGNATURE OF PHYSICIAN OR OTHER AUTHORIZED HEALTHCARE PROFESSIONAL    I certify that the above information is true and correct based on my evaluation of this patient, and represent that the patient requires transport by ambulance and that other forms of transport are contraindicated.  I understand that this information will be used by the Centers for Medicare and Medicaid Services (CMS) to support the determiniation of medical necessity for ambulance services, and I represent that I have personal knowledge of the patient's condition at the time of transport.    X   If this box is checked, I also certify that the patient is physically " or mentally incapable of signing the ambulance service's claim form and that the institution with which I am affiliated has furnished care, services or assistance to the patient.  My signature below is made on behalf of the patient pursuant to 42 .36(b)(4). In accordance with 42 .37, the specific reason(s) that the patient is physically or mentally incapable of signing the claim for is as follows:     Signature of Physician or Healthcare Professional  Date/Time:        (For Scheduled repetitive transport, this form is not valid for transports performed more than 60 days after this date).                                                                                                                                            --------------------------------------------------------------------------------------------  Printed Name and Credentials of Physician or Authorized Healthcare Professional     *Form must be signed by patient's attending physician for scheduled, repetitive transports,.  For non-repetitive ambulance transports, if unable to obtain the signature of the attending physician, any of the following may sign (please select below):     Physician  Clinical Nurse Specialist  Registered Nurse     Physician Assistant  Discharge Planner  Licensed Practical Nurse     Nurse Practitioner

## 2023-03-10 NOTE — DISCHARGE SUMMARY
Patient Name: Kiya Cramer  : 1935  MRN: [id number]    Date of Admission: 3/6/2023  Date of Discharge:  3/10/2023  Primary Care Physician: System, Provider Not In      Chief Complaint:   Speech Problem      Discharge Diagnoses     Active Hospital Problems    Diagnosis  POA   • **Altered mental status, unspecified altered mental status type [R41.82]  Yes   • UTI (urinary tract infection) due to urinary indwelling catheter (HCC) [T83.511A, N39.0]  Unknown   • CAP (community acquired pneumonia) [J18.9]  Unknown   • Oropharyngeal dysphagia [R13.12]  Unknown   • Iron deficiency anemia [D50.9]  Unknown   • Paroxysmal atrial fibrillation (HCC) [I48.0]  Yes   • Acquired hypothyroidism [E03.9]  Yes      Resolved Hospital Problems   No resolved problems to display.        Hospital Course     Ms. Cramer is a 87 y.o. with a past medical history of hypothyroidism, dysphagia requiring PEG tube for nutrition, paroxysmal atrial fibrillation not on anticoagulation as an outpatient to the hospital with altered mental status, slurred speech and decreased activity over the week.      Dysphagia requiring PEG tube for nutrition,    Metabolic Encephalopathy   -Prior to admission patient was resulted not a hospital for encephalopathy related secondary to UTI.  Urinalysis this admission was positive for trace bacteria, 6-12 WBCs, small leukocytes.  Urine culture was negative.,  Urology evaluated, no findings of UTI during this admission.  Neurology was consulted for stroke evaluation.  MRI was performed with no acute stroke findings, did show mild to moderate small vessel disease.  Patient was diagnosed with metabolic encephalopathy likely secondary to aspiration pneumonia.  Was initiated on IV ceftriaxone.  Improved.  Patient was alert, active x3, with intermittent confusion, partly secondary to hospital delirium.  Continue Augmentin for 2 more days with last date of 2023 complete 5-day course of treatment for  pneumonia.      Pneumonia: Chest x-ray showed Infiltrate is left sided however. Not hypoxic.  Retroviral panel was normal, procalcitonin was normal, patient was not hypoxic.  Was initiated on IV ceftriaxone for pneumonia.  Had received 3-day course, continue Augmentin for 2 more days to complete 5-day course of antibiotics for possible aspiration pneumonia for the last date  on 03/11//23.      Dysphagia: Status post replacement.  Continue tube feeds.      Paroxysmal atrial fibrillation: Sinus currently. Not on chronic AC since she had a brief single episode of afib previously. Not in afib on ekg  during admission.  Continue home metoprolol and aspirin.    At the time of discharge patient was told to take all medications as prescribed, keep all follow-up appointments, and call their doctor or return to the hospital with any worsening or concerning symptoms.               Day of Discharge     Subjective:    Patient seen this morning, no acute events overnight.  Caregiver present at bedside.  Patient is alert, oriented x3, but not quite at baseline per caregiver.  Patient  was asking when she will be discharged to [facility]..  Denies complaints when asked.  No fevers no chills. No  Abdominal pain, nausea or vomiting.    Physical Exam:  Temp:  [95.9 °F (35.5 °C)-97.6 °F (36.4 °C)] 97.6 °F (36.4 °C)  Heart Rate:  [76-99] 96  Resp:  [16-18] 18  BP: (133-158)/(72-94) 133/72  Body mass index is 16.43 kg/m².  Physical Exam    General: Alert and oriented x3, no acute distress,, mild dysarthria, chronically ill-appearing  HEENT: Normocephalic, atraumatic  CV: Regular rate and rhythm, no murmurs rubs or gallops  Lungs: CTA, nonlabored breathing, on room air,  Abdomen: Soft, nondistended, PEG tube in place  Extremities: No significant peripheral edema , no cyanosis     Consultants     Consult Orders (all) (From admission, onward)     Start     Ordered    03/08/23 4179  Inpatient Access Center Consult  Once        Comments:  Hallucinations   Provider:  (Not yet assigned)    03/08/23 2154    03/08/23 1816  Inpatient Nutrition Consult  Once        Provider:  (Not yet assigned)    03/08/23 1815    03/08/23 1000  Inpatient Physical Medicine Rehab Consult  Once        Specialty:  Physical Medicine and Rehabilitation  Provider:  Olu James MD    03/08/23 1001    03/08/23 0702  Inpatient Urology Consult  IN         Specialty:  Urology  Provider:  Kip Clements MD    03/07/23 1832    03/07/23 0903  Inpatient Nutrition Consult  Once        Provider:  (Not yet assigned)    03/07/23 0902    03/07/23 0334  Notify Stroke Coordinator  Once        Provider:  (Not yet assigned)    03/07/23 0335    03/07/23 0334  Inpatient Rehab Admission Consult  Once        Provider:  (Not yet assigned)    03/07/23 0335    03/07/23 0334  Consult to Case Management   Once        Provider:  (Not yet assigned)    03/07/23 0335    03/07/23 0334  Consult to Diabetes Educator  Once,   Status:  Canceled        Provider:  (Not yet assigned)    03/07/23 0335    03/07/23 0334  Inpatient Neurology Consult Stroke  Once        Specialty:  Neurology  Provider:  (Not yet assigned)    03/07/23 0335    03/07/23 0229  [facility] (on-call MD unless specified) Details  Once        Specialty:  Hospitalist  Provider:  (Not yet assigned)    03/07/23 0228              Procedures     * Surgery not found *      Imaging Results (All)     Procedure Component Value Units Date/Time    FL Video Swallow With Speech Single Contrast [[id number]] Collected: 03/08/23 1139     Updated: 03/08/23 1143    Narrative:      VIDEO SWALLOW STUDY     HISTORY: Dysphagia.     [3 minutes 18 seconds { fluoroscopy was provided for the speech  pathologist during a video swallow study. 4996 images were saved.     Penetration and aspiration was seen       Impression:      Fluoroscopy was provided for the speech pathologist during a  video swallow study. For full details please see the speech  pathology  report     This report was finalized on 3/8/2023 11:40 AM by Dr. Leon Ngo M.D.       MRI Brain Without Contrast [[id number]] Collected: 03/07/23 1055     Updated: 03/07/23 1420    Narrative:      MRI EXAMINATION OF THE BRAIN WITHOUT CONTRAST     HISTORY: Slurred speech.     COMPARISON: CT head 03/06/2023.     TECHNIQUE: A MRI examination of the brain was performed utilizing  sagittal T1, axial diffusion, T1, T2, T2 FLAIR and gradient echo T2  imaging.     FINDINGS: There is no evidence of restricted diffusion, hydrocephalus or  of abnormal extra-axial fluid. There is expected flow-void in the  basilar artery and in the distal aspect of internal carotid arteries  bilaterally. Mild to moderate small vessel ischemic disease is noted.  There is no evidence of mass or mass effect on this noncontrasted MRI  examination of brain. A trace amount of fluid is present within the  mastoid air cells bilaterally.       Impression:      Mild to moderate small vessel ischemic disease is noted.  There is no evidence of acute infarction. A trace amount fluid is  present within the mastoid air cells bilaterally.     This report was finalized on 3/7/2023 2:17 PM by Dr. Juan Luis Montero M.D.       CT Head Without Contrast [[id number]] Collected: 03/07/23 0032     Updated: 03/07/23 0037    Narrative:      CT HEAD WITHOUT CONTRAST     HISTORY: Altered mental status     COMPARISON: None available.     TECHNIQUE: Axial CT imaging was obtained through the brain. No IV  contrast was administered.     FINDINGS:  No acute intracranial hemorrhage is seen. There is minimal atrophy.  There is some periventricular and deep white matter microangiopathic  change. There is no midline shift or mass effect. Old infarct is  suspected within the anterior limb of the left internal capsule. The  paranasal sinuses appear clear. There is mild partial opacification of  the mastoid air cells.       Impression:      No acute intracranial  findings.     Radiation dose reduction techniques were utilized, including automated  exposure control and exposure modulation based on body size.     This report was finalized on 3/7/2023 12:34 AM by Dr. Luz Maria Barboza M.D.       XR Chest 1 View [[id number]] Collected: 03/07/23 0014     Updated: 03/07/23 0019    Narrative:      SINGLE VIEW OF THE CHEST     HISTORY: Shortness of air     COMPARISON: 03/24/2013     FINDINGS:  Cardiomegaly is present. Patient has areas of chronic scarring within  both lungs. There is calcification of the aorta. There is left basilar  consolidation. Pneumonia is not excluded. There is some scarring versus  atelectasis noted at the right lung base. Lung volumes are overall  diminished.       Impression:      Nonspecific left basilar consolidation. Pneumonia cannot be excluded.     This report was finalized on 3/7/2023 12:16 AM by Dr. Luz Maria Barboza M.D.             Results for orders placed during the hospital encounter of 03/06/23    Adult transthoracic echo complete    Interpretation Summary  •  Left ventricular systolic function is normal. Calculated left ventricular EF = 62.5%  •  Left ventricular diastolic function is consistent with (grade Ia w/high LAP) impaired relaxation.  •  There is anterior mitral leaflet thickening present.  •  Moderate mitral valve regurgitation is present.  •  Severe tricuspid valve regurgitation is present.  •  Estimated right ventricular systolic pressure from tricuspid regurgitation is mildly elevated (35-45 mmHg).    Pertinent Labs     Results from last 7 days   Lab Units 03/10/23  0627 03/09/23  0648 03/08/23 0620 03/07/23  0629   WBC 10*3/mm3 8.65 7.21 5.31 7.82   HEMOGLOBIN g/dL 11.9* 12.0 11.5* 11.6*   PLATELETS 10*3/mm3 246 206 198 246     Results from last 7 days   Lab Units 03/10/23  0627 03/09/23  0648 03/08/23  0620 03/07/23  0629   SODIUM mmol/L 130* 133* 134* 133*   POTASSIUM mmol/L 4.8 4.9 3.2* 3.8   CHLORIDE mmol/L 94* 98 97* 91*    CO2 mmol/L 26.6 28.5 29.0 30.0*   BUN mg/dL 11 9 12 20   CREATININE mg/dL 0.32* 0.36* 0.32* 0.40*   GLUCOSE mg/dL 94 113* 89 90   Estimated Creatinine Clearance: 99.4 mL/min (A) (by C-G formula based on SCr of 0.32 mg/dL (L)).  Results from last 7 days   Lab Units 03/07/23  0629 03/06/23  2351   ALBUMIN g/dL 3.1* 3.6   BILIRUBIN mg/dL 0.2 0.3   ALK PHOS U/L 132* 149*   AST (SGOT) U/L 14 15   ALT (SGPT) U/L 8 14     Results from last 7 days   Lab Units 03/10/23  0627 03/09/23  0648 03/08/23  0620 03/07/23  0629 03/06/23  2351   CALCIUM mg/dL 10.0 9.5 8.5* 9.0 9.9   ALBUMIN g/dL  --   --   --  3.1* 3.6   MAGNESIUM mg/dL  --  2.0  --   --  2.1       Results from last 7 days   Lab Units 03/07/23  0258 03/06/23  2351   HSTROP T ng/L 18* 25*   PROBNP pg/mL  --  351.0       Results from last 7 days   Lab Units 03/07/23  0629   CHOLESTEROL mg/dL 160   TRIGLYCERIDES mg/dL 69   HDL CHOL mg/dL 68*   LDL CHOL mg/dL 79     Results from last 7 days   Lab Units 03/07/23  0258 03/07/23  0239 03/06/23  2359   BLOODCX  No growth at 3 days No growth at 3 days  --    URINECX   --   --  No growth     Results from last 7 days   Lab Units 03/07/23  0730   COVID19  Not Detected       Test Results Pending at Discharge     Pending Labs     Order Current Status    Blood Culture - Blood, Arm, Right Preliminary result    Blood Culture - Blood, Arm, Right Preliminary result          Discharge Details        Discharge Medications      New Medications      Instructions Start Date   amoxicillin-clavulanate 875-125 MG per tablet  Commonly known as: Augmentin   1 tablet, Oral, 2 Times Daily      atorvastatin 80 MG tablet  Commonly known as: LIPITOR   80 mg, Oral, Nightly      bisacodyl 10 MG suppository  Commonly known as: DULCOLAX   10 mg, Rectal, Daily PRN      ferrous sulfate 300 (60 Fe) MG/5ML syrup   300 mg, Oral, Every Evening      guaifenesin 100 MG/5ML liquid  Commonly known as: ROBITUSSIN   400 mg, Oral, 3 Times Daily PRN      senna 8.6 MG  tablet  Commonly known as: SENOKOT   1 tablet, Oral, Nightly, Hold for loose stools.         Continue These Medications      Instructions Start Date   albuterol (2.5 MG/3ML) 0.083% nebulizer solution  Commonly known as: PROVENTIL   2.5 mg, Nebulization, Every 12 Hours PRN      aspirin 81 MG EC tablet   81 mg, Oral, Daily      Belsomra 10 MG tablet  Generic drug: Suvorexant   10 mg, Oral, Daily      budesonide 0.5 MG/2ML nebulizer solution  Commonly known as: PULMICORT   0.5 mg, Nebulization, 2 Times Daily      levothyroxine 25 MCG tablet  Commonly known as: SYNTHROID, LEVOTHROID   50 mcg, Oral, Daily      metoprolol tartrate 50 MG tablet  Commonly known as: LOPRESSOR   50 mg, Oral, 2 Times Daily      mirtazapine 30 MG tablet  Commonly known as: REMERON   15 mg, Oral, Nightly      traZODone 50 MG tablet  Commonly known as: DESYREL   50 mg, Oral, Nightly      Vyzulta 0.024 % solution  Generic drug: Latanoprostene Bunod   1 drop, Ophthalmic, Every Night at Bedtime, Each eye             Allergies   Allergen Reactions   • Cardizem [Diltiazem Hcl]        Discharge Disposition:  Short Term Hospital (DC - External)      Discharge Diet:  Diet Order   Procedures   • NPO Diet NPO Type: Strict NPO       Discharge Activity:       CODE STATUS:    Code Status and Medical Interventions:   Ordered at: 03/07/23 0335     Code Status (Patient has no pulse and is not breathing):    CPR (Attempt to Resuscitate)     Medical Interventions (Patient has pulse or is breathing):    Full Support     Release to patient:    Routine Release       No future appointments.   Follow-up Information     System, Provider Not In Follow up in 1 week(s).    Contact information:  [facility] [id number]                         Time Spent on Discharge:  Greater than 30 minutes      Abiel Ibarra MD  [facility]  03/10/23  10:26 EST

## 2023-03-12 LAB
BACTERIA SPEC AEROBE CULT: NORMAL
BACTERIA SPEC AEROBE CULT: NORMAL

## 2023-03-20 NOTE — PROGRESS NOTES
Enter Query Response Below      Query Response: Severe malnutrition               If applicable, please update the problem list.     Patient: Kiya Cramer        : 1935  Account: [id number]           Admit Date: 3/6/2023        How to Respond to this query:       a. Click New Note     b. Answer query within the yellow box.                c. Update the Problem List, if applicable.      If you have any questions about this query contact me at: [email or web address]    Dr. Ibarra    Admission for Altered Mental Status with slurred speech and decreased activity for over a week.  Discharge Summary documents Dysphagia requiring PEG tube for nutrition. Malnutrition Severity Assessment (MSA) was completed by Registered Dietician on 3/9. Per the MSA, patient meets ASPEN criteria for the nutrition diagnosis of severe malnutrition of chronic disease based on muscle and fat loss:  Loss of Muscle Mass Findings Severe   Anglican Region Severe - deep hollowing/scooping, lack of muscle to touch, facial bones well defined  Clavicle Bone Region Severe - protruding prominent bone  Acromion Bone Region Severe - squared shoulders, bones, and acromion process protrusion prominent  Dorsal Hand Region Severe - prominent depression  Patellar Region Severe - prominent bone, square looking, very little muscle definition  Anterior Thigh Region Severe - line/depression along thigh, obviously thin  Posterior Calf Region Severe - thin with very little definition/firmness  Subcutaneous Fat Loss Findings Severe   Orbital Region  Severe - pronounced hollowness/depression, dark circles, loose saggy skin  Upper Arm Region Severe - mostly skin, very little space between folds, fingers touch  Treatment included continous tube feeding of Isosource 1.5.  After study, can nutritional status be clarified as:  Severe malnutrition  Malnutrition  Other  Unable to determine    By submitting this query, we are merely seeking further clarification of  documentation to accurately reflect all conditions that you are monitoring, evaluating, treating or that extend the hospitalization or utilize additional resources of care. Please utilize your independent clinical judgment when addressing the question(s) above.     This query and your response, once completed, will be entered into the legal medical record.    Sincerely,  Berkley CHOPRA RN, [facility]  Clinical Documentation Integrity Program   [email or web address]